# Patient Record
Sex: MALE | Race: WHITE | NOT HISPANIC OR LATINO | Employment: OTHER | URBAN - METROPOLITAN AREA
[De-identification: names, ages, dates, MRNs, and addresses within clinical notes are randomized per-mention and may not be internally consistent; named-entity substitution may affect disease eponyms.]

---

## 2017-01-11 RX ORDER — NIFEDIPINE 90 MG/1
90 TABLET, FILM COATED, EXTENDED RELEASE ORAL DAILY
Qty: 90 TABLET | Refills: 0 | Status: SHIPPED | OUTPATIENT
Start: 2017-01-11 | End: 2023-03-27

## 2018-08-01 ENCOUNTER — OFFICE VISIT (OUTPATIENT)
Dept: DERMATOLOGY | Facility: CLINIC | Age: 63
End: 2018-08-01
Payer: COMMERCIAL

## 2018-08-01 VITALS — BODY MASS INDEX: 26.76 KG/M2 | WEIGHT: 176 LBS

## 2018-08-01 DIAGNOSIS — L81.4 LENTIGINES: ICD-10-CM

## 2018-08-01 DIAGNOSIS — L30.9 DERMATITIS: ICD-10-CM

## 2018-08-01 DIAGNOSIS — D18.00 ANGIOMA: ICD-10-CM

## 2018-08-01 DIAGNOSIS — L82.1 SEBORRHEIC KERATOSES: Primary | ICD-10-CM

## 2018-08-01 PROCEDURE — 3008F BODY MASS INDEX DOCD: CPT | Mod: CPTII,S$GLB,, | Performed by: DERMATOLOGY

## 2018-08-01 PROCEDURE — 99202 OFFICE O/P NEW SF 15 MIN: CPT | Mod: S$GLB,,, | Performed by: DERMATOLOGY

## 2018-08-01 PROCEDURE — 99999 PR PBB SHADOW E&M-EST. PATIENT-LVL III: CPT | Mod: PBBFAC,,, | Performed by: DERMATOLOGY

## 2018-08-01 RX ORDER — FLUTICASONE PROPIONATE 50 MCG
SPRAY, SUSPENSION (ML) NASAL
COMMUNITY
Start: 2018-07-31 | End: 2023-03-27 | Stop reason: ALTCHOICE

## 2018-08-01 NOTE — PROGRESS NOTES
Subjective:       Patient ID:  Edvin Monzon is a 62 y.o. male who presents for   Chief Complaint   Patient presents with    Rash     left elbow     History of Present Illness: The patient presents with chief complaint of rash.  Location: elbows  Duration: months  Signs/Symptoms: itch    Prior treatments: otc creams           Review of Systems   Constitutional: Negative for fever.   Skin: Positive for itching and rash.   Hematologic/Lymphatic: Does not bruise/bleed easily.        Objective:    Physical Exam   Constitutional: He appears well-developed and well-nourished. No distress.   Neurological: He is alert and oriented to person, place, and time. He is not disoriented.   Psychiatric: He has a normal mood and affect.   Skin:   Areas Examined (abnormalities noted in diagram):   Scalp / Hair Palpated and Inspected  Head / Face Inspection Performed  Neck Inspection Performed  Chest / Axilla Inspection Performed  Abdomen Inspection Performed  Back Inspection Performed  RUE Inspected  LUE Inspection Performed                   Diagram Legend     Erythematous scaling macule/papule c/w actinic keratosis       Vascular papule c/w angioma      Pigmented verrucoid papule/plaque c/w seborrheic keratosis      Yellow umbilicated papule c/w sebaceous hyperplasia      Irregularly shaped tan macule c/w lentigo     1-2 mm smooth white papules consistent with Milia      Movable subcutaneous cyst with punctum c/w epidermal inclusion cyst      Subcutaneous movable cyst c/w pilar cyst      Firm pink to brown papule c/w dermatofibroma      Pedunculated fleshy papule(s) c/w skin tag(s)      Evenly pigmented macule c/w junctional nevus     Mildly variegated pigmented, slightly irregular-bordered macule c/w mildly atypical nevus      Flesh colored to evenly pigmented papule c/w intradermal nevus       Pink pearly papule/plaque c/w basal cell carcinoma      Erythematous hyperkeratotic cursted plaque c/w SCC      Surgical scar with no  "sign of skin cancer recurrence      Open and closed comedones      Inflammatory papules and pustules      Verrucoid papule consistent consistent with wart     Erythematous eczematous patches and plaques     Dystrophic onycholytic nail with subungual debris c/w onychomycosis     Umbilicated papule    Erythematous-base heme-crusted tan verrucoid plaque consistent with inflamed seborrheic keratosis     Erythematous Silvery Scaling Plaque c/w Psoriasis     See annotation      Assessment / Plan:        Seborrheic keratoses  Brochure provided  reassurance      Lentigines  The "ABCD" rules to observe pigmented lesions were reviewed.      Angiomas  reassurance      Dermatitis( irritant contact0 elbows  Topicort spray  cerave lotion  Dc leaning on areas             Follow-up in about 1 year (around 8/1/2019).  "

## 2018-12-05 ENCOUNTER — OFFICE VISIT (OUTPATIENT)
Dept: OTOLARYNGOLOGY | Facility: CLINIC | Age: 63
End: 2018-12-05
Payer: COMMERCIAL

## 2018-12-05 ENCOUNTER — CLINICAL SUPPORT (OUTPATIENT)
Dept: AUDIOLOGY | Facility: CLINIC | Age: 63
End: 2018-12-05
Payer: COMMERCIAL

## 2018-12-05 VITALS — SYSTOLIC BLOOD PRESSURE: 140 MMHG | HEART RATE: 82 BPM | TEMPERATURE: 98 F | DIASTOLIC BLOOD PRESSURE: 91 MMHG

## 2018-12-05 DIAGNOSIS — J30.9 CHRONIC ALLERGIC RHINITIS: Primary | ICD-10-CM

## 2018-12-05 DIAGNOSIS — H90.3 SENSORINEURAL HEARING LOSS, BILATERAL: Primary | ICD-10-CM

## 2018-12-05 DIAGNOSIS — H90.3 HEARING LOSS, SENSORINEURAL, HIGH FREQUENCY, BILATERAL: ICD-10-CM

## 2018-12-05 DIAGNOSIS — H93.13 TINNITUS, BILATERAL: ICD-10-CM

## 2018-12-05 DIAGNOSIS — H61.21 IMPACTED CERUMEN, RIGHT EAR: ICD-10-CM

## 2018-12-05 PROCEDURE — 69210 REMOVE IMPACTED EAR WAX UNI: CPT | Mod: S$GLB,,, | Performed by: OTOLARYNGOLOGY

## 2018-12-05 PROCEDURE — 99999 PR PBB SHADOW E&M-EST. PATIENT-LVL III: CPT | Mod: PBBFAC,,, | Performed by: OTOLARYNGOLOGY

## 2018-12-05 PROCEDURE — 92567 TYMPANOMETRY: CPT | Mod: S$GLB,,, | Performed by: PHYSICIAN ASSISTANT

## 2018-12-05 PROCEDURE — 99999 PR PBB SHADOW E&M-EST. PATIENT-LVL I: CPT | Mod: PBBFAC,,,

## 2018-12-05 PROCEDURE — 92557 COMPREHENSIVE HEARING TEST: CPT | Mod: S$GLB,,, | Performed by: PHYSICIAN ASSISTANT

## 2018-12-05 PROCEDURE — 99203 OFFICE O/P NEW LOW 30 MIN: CPT | Mod: 25,S$GLB,, | Performed by: OTOLARYNGOLOGY

## 2018-12-05 NOTE — PROGRESS NOTES
Audiological Evaluation:    Test results indicated a moderate to profound sloping SNHL AU, worse AD with good speech discrimination scores bilaterally.  Tympanometry revealed Type A tympanograms bilaterally.  Mr. Monzon complains of understanding speech in noise and understanding programs on the television.  Recommend:  1.  Otologic evaluation  2.  Annual hearing evaluations  3.  Noise protection  4.  HAC

## 2018-12-05 NOTE — PROGRESS NOTES
Subjective:       Patient ID: Edvin Monzon is a 63 y.o. male.    Chief Complaint: Cerumen Impaction    HPI: Mr. Monzon is a 63 year old CM who indicates his diagnosis with [place] nose  by Dr. Edvin Choi at [facility] several years ago.    He indicates postnasal drip and throat congestion symptoms primarily.  He indicates irritation the back of his throat with some green mucus expectoration.  He also indicates a pressure sensation around his right eye with occasional visual sx.    He was diagnosed with by Dr. Soares at [facility] with ocular rosacea.  His right eye is watering today.  Lacrimal obstruction was ruled out by another physician.  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  He also indicates inhalation of grain from [place].  He indicates dust and cat allergies; he lives with a cat.  He indicates a history of noise exposure shooting fireworks as a  youth in [facility].  I amrovided with a copy of his audiogram ordeed by Dr. Edvin Choi at [facility] and completed in November 2015, 3 years ago.  The tudy indicates the patient has normal hearing between 251,000 hertz with severely sloping bilateral high-frequency right ear greater than left sensorineural hearing loss in evidence.  He completed an audiometric study ordered by me here in November 2010, 8 years ago.  The studies are quite similar.  He had complained of right ear tinnitus during that time frame.    PMH:  Hearing loss, hayfever  PSH:  Tonsillectomy, arthroscopic right shoulder procedure 2015  Family history:  High cholesterol, hearing loss, mother with Alzheimer's  Allergies:  Cats, dust  Habits:  3 cups of coffee per day  Occupation:  Artist/  Review of Systems   Ears: Positive for hearing loss, ringing in ear and family history of hearing loss.    Nose:  Positive for postnasal drip.    Other:  Positive for arthritis. Negative for  rash.     Current Outpatient Medications on File Prior to Visit   Medication Sig Dispense Refill    fluticasone (FLONASE) 50 mcg/actuation nasal spray       naproxen (NAPROSYN) 500 MG tablet Take 500 mg by mouth 2 (two) times daily as needed.       cyclobenzaprine (FLEXERIL) 10 MG tablet       diazepam (VALIUM) 10 MG Tab       hydrocodone-acetaminophen 7.5-325mg (NORCO) 7.5-325 mg per tablet       ketoconazole (NIZORAL) 2 % cream AAA face bid 60 g 3    nifedipine (ADALAT CC) 90 MG TbSR Take 1 tablet (90 mg total) by mouth once daily. 90 tablet 0    tramadol (ULTRAM) 50 mg tablet        No current facility-administered medications on file prior to visit.            The patient completed an audiometric study performed by the [facility] audiology service.  Study is duplicated below and the results are reviewed with him in detail  Objective:         Blood pressure 140/91 pulse 82 temperature 97.6° height 5 ft 8 in weight 176 lb  General:  Alert and oriented gentleman in no acute distress; there is obvious tearing of his right eye  Both ears were examined under the microscope in the micro procedure room  Procedure:  A large occlusive cerumen impaction is extracted from the right ear canal with a blunt curette and suction.  Physical Exam   Constitutional: He is oriented to person, place, and time. He appears well-developed and well-nourished.   HENT:   Head: Normocephalic.       Right Ear: Hearing, tympanic membrane and ear canal normal. No drainage. No foreign bodies. No mastoid tenderness. Tympanic membrane is not perforated. No decreased hearing is noted.   Left Ear: Hearing, tympanic membrane and ear canal normal. No drainage. No foreign bodies. No mastoid tenderness. Tympanic membrane is not perforated. No decreased hearing is noted.   Ears:    Nose: No nose lacerations, nasal deformity, septal deviation or nasal septal hematoma. No epistaxis. Right sinus exhibits no maxillary sinus tenderness and no frontal sinus  tenderness. Left sinus exhibits no maxillary sinus tenderness and no frontal sinus tenderness.       Mouth/Throat: Uvula is midline, oropharynx is clear and moist and mucous membranes are normal. He does not have dentures. No oral lesions. No trismus in the jaw. No uvula swelling or dental caries. No oropharyngeal exudate or tonsillar abscesses.       Neck: No thyromegaly present.   Pulmonary/Chest: Effort normal. No stridor.   Lymphadenopathy:     He has no cervical adenopathy.   Neurological: He is alert and oriented to person, place, and time.   Skin: No rash noted.   Psychiatric: His behavior is normal.       Assessment:       1. Chronic allergic rhinitis    2. Tinnitus, bilateral    3. Hearing loss, sensorineural, high frequency, bilateral    4. Impacted cerumen, right ear        Plan:     Occlusive cerumen impaction extracted from AD eac  Audiometry reviewed, compared to [facility] 2015 study and our 2010 study  Yearly ear cleaning recommended  Hearing protection encouraged prn  Monitor hearing q 2-3 years  Use of A[place] nasal ist prn may help  Use of Bang Med rinse kit with distilled water may help cleanse sinuses

## 2019-10-16 NOTE — PATIENT INSTRUCTIONS
Occlusive cerumen impaction extracted from AD eac  Audiometry reviewed, compared to [facility] 2015 study and our 2010 study  Yearly ear cleaning recommended  Hearing protection encouraged prn  Monitor hearing q 2-3 years  Use of Ayr nasal ist prn may help  Use of Bang Med rinse kit with distilled water may help cleanse sinuses               [General Appearance - Alert] : alert [Extraocular Movements] : extraocular movements were intact [Outer Ear] : the ears and nose were normal in appearance [Nasal Cavity] : the nasal mucosa and septum were normal [Neck Appearance] : the appearance of the neck was normal [] : the neck was supple [Auscultation Breath Sounds / Voice Sounds] : lungs were clear to auscultation bilaterally [Heart Sounds] : normal S1 and S2 [Edema] : there was no peripheral edema [Abnormal Walk] : normal gait [Musculoskeletal - Swelling] : no joint swelling seen [Motor Tone] : muscle strength and tone were normal [Skin Color & Pigmentation] : normal skin color and pigmentation [No Focal Deficits] : no focal deficits [Oriented To Time, Place, And Person] : oriented to person, place, and time [FreeTextEntry1] : inappropriate affect - made jokes that were inappropriate while in the exam room.

## 2020-11-03 ENCOUNTER — OFFICE VISIT (OUTPATIENT)
Dept: DERMATOLOGY | Facility: CLINIC | Age: 65
End: 2020-11-03
Payer: MEDICARE

## 2020-11-03 VITALS — BODY MASS INDEX: 26.76 KG/M2 | WEIGHT: 176 LBS

## 2020-11-03 DIAGNOSIS — D18.01 CHERRY ANGIOMA: ICD-10-CM

## 2020-11-03 DIAGNOSIS — L82.1 SEBORRHEIC KERATOSES: ICD-10-CM

## 2020-11-03 DIAGNOSIS — L81.4 LENTIGINES: ICD-10-CM

## 2020-11-03 DIAGNOSIS — Z12.83 SKIN EXAM, SCREENING FOR CANCER: ICD-10-CM

## 2020-11-03 DIAGNOSIS — L57.0 MULTIPLE ACTINIC KERATOSES: Primary | ICD-10-CM

## 2020-11-03 PROCEDURE — 17000 DESTRUCT PREMALG LESION: CPT | Mod: S$PBB,,, | Performed by: DERMATOLOGY

## 2020-11-03 PROCEDURE — 17003 DESTRUCTION, PREMALIGNANT LESIONS; SECOND THROUGH 14 LESIONS: ICD-10-PCS | Mod: S$PBB,,, | Performed by: DERMATOLOGY

## 2020-11-03 PROCEDURE — 17003 DESTRUCT PREMALG LES 2-14: CPT | Mod: PBBFAC,PO | Performed by: DERMATOLOGY

## 2020-11-03 PROCEDURE — 99999 PR PBB SHADOW E&M-EST. PATIENT-LVL III: CPT | Mod: PBBFAC,,, | Performed by: DERMATOLOGY

## 2020-11-03 PROCEDURE — 99213 PR OFFICE/OUTPT VISIT, EST, LEVL III, 20-29 MIN: ICD-10-PCS | Mod: 25,S$PBB,, | Performed by: DERMATOLOGY

## 2020-11-03 PROCEDURE — 17000 DESTRUCT PREMALG LESION: CPT | Mod: PBBFAC,PO | Performed by: DERMATOLOGY

## 2020-11-03 PROCEDURE — 99213 OFFICE O/P EST LOW 20 MIN: CPT | Mod: PBBFAC,PO,25 | Performed by: DERMATOLOGY

## 2020-11-03 PROCEDURE — 17000 PR DESTRUCTION(LASER SURGERY,CRYOSURGERY,CHEMOSURGERY),PREMALIGNANT LESIONS,FIRST LESION: ICD-10-PCS | Mod: S$PBB,,, | Performed by: DERMATOLOGY

## 2020-11-03 PROCEDURE — 17003 DESTRUCT PREMALG LES 2-14: CPT | Mod: S$PBB,,, | Performed by: DERMATOLOGY

## 2020-11-03 PROCEDURE — 99213 OFFICE O/P EST LOW 20 MIN: CPT | Mod: 25,S$PBB,, | Performed by: DERMATOLOGY

## 2020-11-03 PROCEDURE — 99999 PR PBB SHADOW E&M-EST. PATIENT-LVL III: ICD-10-PCS | Mod: PBBFAC,,, | Performed by: DERMATOLOGY

## 2020-11-03 NOTE — PROGRESS NOTES
Subjective:       Patient ID:  Edvin Monzon is a 65 y.o. male who presents for   Chief Complaint   Patient presents with    Spot     face, itchy    Skin Check     UBSE     New lesions on left face not painful.  The rash on his elbows resolved.      Review of Systems   Constitutional: Negative for fever, chills, weight loss, weight gain, fatigue, night sweats and malaise.   Skin: Negative for daily sunscreen use, activity-related sunscreen use and wears hat.   Hematologic/Lymphatic: Does not bruise/bleed easily.        Objective:    Physical Exam   Constitutional: He appears well-developed and well-nourished. No distress.   Neurological: He is alert and oriented to person, place, and time. He is not disoriented.   Psychiatric: He has a normal mood and affect.   Skin:   Areas Examined (abnormalities noted in diagram):   Head / Face Inspection Performed  Neck Inspection Performed  Chest / Axilla Inspection Performed  Back Inspection Performed  RUE Inspected  LUE Inspection Performed                   Diagram Legend     Erythematous scaling macule/papule c/w actinic keratosis       Vascular papule c/w angioma      Pigmented verrucoid papule/plaque c/w seborrheic keratosis      Yellow umbilicated papule c/w sebaceous hyperplasia      Irregularly shaped tan macule c/w lentigo     1-2 mm smooth white papules consistent with Milia      Movable subcutaneous cyst with punctum c/w epidermal inclusion cyst      Subcutaneous movable cyst c/w pilar cyst      Firm pink to brown papule c/w dermatofibroma      Pedunculated fleshy papule(s) c/w skin tag(s)      Evenly pigmented macule c/w junctional nevus     Mildly variegated pigmented, slightly irregular-bordered macule c/w mildly atypical nevus      Flesh colored to evenly pigmented papule c/w intradermal nevus       Pink pearly papule/plaque c/w basal cell carcinoma      Erythematous hyperkeratotic cursted plaque c/w SCC      Surgical scar with no sign of skin cancer  "recurrence      Open and closed comedones      Inflammatory papules and pustules      Verrucoid papule consistent consistent with wart     Erythematous eczematous patches and plaques     Dystrophic onycholytic nail with subungual debris c/w onychomycosis     Umbilicated papule    Erythematous-base heme-crusted tan verrucoid plaque consistent with inflamed seborrheic keratosis     Erythematous Silvery Scaling Plaque c/w Psoriasis     See annotation      Assessment / Plan:        Multiple actinic keratoses   Cryosurgery Procedure Note    Verbal consent from the patient is obtained and the patient is aware of the precancerous quality and need for treatment of these lesions. Liquid nitrogen cryosurgery is applied to the 2 actinic keratoses, as detailed in the physical exam, to produce a freeze injury.      Seborrheic keratoses  reassurance    Cherry angiomas  reassurance      Lentigines  The "ABCD" rules to observe pigmented lesions were reviewed.      Skin exam, screening for cancer.    Upper body skin examination performed today including at least 6 points as noted in physical examination. No lesions suspicious for malignancy noted.               Follow up in about 6 months (around 5/3/2021).  "

## 2021-02-10 ENCOUNTER — IMMUNIZATION (OUTPATIENT)
Dept: PRIMARY CARE CLINIC | Facility: CLINIC | Age: 66
End: 2021-02-10
Payer: MEDICARE

## 2021-02-10 DIAGNOSIS — Z23 NEED FOR VACCINATION: Primary | ICD-10-CM

## 2021-02-10 PROCEDURE — 91300 PR SARS-COV- 2 COVID-19 VACCINE, NO PRSV, 30MCG/0.3ML, IM: CPT | Mod: PBBFAC | Performed by: INTERNAL MEDICINE

## 2021-02-10 PROCEDURE — 0001A PR IMMUNIZ ADMIN, SARS-COV-2 COVID-19 VACC, 30MCG/0.3ML, 1ST DOSE: CPT | Mod: PBBFAC | Performed by: INTERNAL MEDICINE

## 2021-02-10 RX ADMIN — RNA INGREDIENT BNT-162B2 0.3 ML: 0.23 INJECTION, SUSPENSION INTRAMUSCULAR at 12:02

## 2021-03-03 ENCOUNTER — IMMUNIZATION (OUTPATIENT)
Dept: PRIMARY CARE CLINIC | Facility: CLINIC | Age: 66
End: 2021-03-03
Payer: MEDICARE

## 2021-03-03 DIAGNOSIS — Z23 NEED FOR VACCINATION: Primary | ICD-10-CM

## 2021-03-03 PROCEDURE — 91300 PR SARS-COV- 2 COVID-19 VACCINE, NO PRSV, 30MCG/0.3ML, IM: CPT | Mod: S$GLB,,, | Performed by: INTERNAL MEDICINE

## 2021-03-03 PROCEDURE — 0002A PR IMMUNIZ ADMIN, SARS-COV-2 COVID-19 VACC, 30MCG/0.3ML, 2ND DOSE: CPT | Mod: CV19,S$GLB,, | Performed by: INTERNAL MEDICINE

## 2021-03-03 PROCEDURE — 0002A PR IMMUNIZ ADMIN, SARS-COV-2 COVID-19 VACC, 30MCG/0.3ML, 2ND DOSE: ICD-10-PCS | Mod: CV19,S$GLB,, | Performed by: INTERNAL MEDICINE

## 2021-03-03 PROCEDURE — 91300 PR SARS-COV- 2 COVID-19 VACCINE, NO PRSV, 30MCG/0.3ML, IM: ICD-10-PCS | Mod: S$GLB,,, | Performed by: INTERNAL MEDICINE

## 2021-03-03 RX ADMIN — Medication 0.3 ML: at 01:03

## 2021-10-01 ENCOUNTER — IMMUNIZATION (OUTPATIENT)
Dept: INTERNAL MEDICINE | Facility: CLINIC | Age: 66
End: 2021-10-01
Payer: MEDICARE

## 2021-10-01 DIAGNOSIS — Z23 NEED FOR VACCINATION: Primary | ICD-10-CM

## 2021-10-01 PROCEDURE — 0003A COVID-19, MRNA, LNP-S, PF, 30 MCG/0.3 ML DOSE VACCINE: CPT | Mod: CV19,PBBFAC | Performed by: INTERNAL MEDICINE

## 2021-10-01 PROCEDURE — 91300 COVID-19, MRNA, LNP-S, PF, 30 MCG/0.3 ML DOSE VACCINE: CPT | Mod: PBBFAC

## 2021-12-27 ENCOUNTER — NURSE TRIAGE (OUTPATIENT)
Dept: ADMINISTRATIVE | Facility: CLINIC | Age: 66
End: 2021-12-27
Payer: MEDICARE

## 2021-12-30 ENCOUNTER — NURSE TRIAGE (OUTPATIENT)
Dept: ADMINISTRATIVE | Facility: CLINIC | Age: 66
End: 2021-12-30
Payer: MEDICARE

## 2022-03-30 ENCOUNTER — PATIENT MESSAGE (OUTPATIENT)
Dept: DERMATOLOGY | Facility: CLINIC | Age: 67
End: 2022-03-30
Payer: MEDICARE

## 2022-04-22 ENCOUNTER — PATIENT MESSAGE (OUTPATIENT)
Dept: OPHTHALMOLOGY | Facility: CLINIC | Age: 67
End: 2022-04-22
Payer: MEDICARE

## 2022-04-22 ENCOUNTER — PATIENT MESSAGE (OUTPATIENT)
Dept: DERMATOLOGY | Facility: CLINIC | Age: 67
End: 2022-04-22
Payer: MEDICARE

## 2022-04-28 ENCOUNTER — NURSE TRIAGE (OUTPATIENT)
Dept: ADMINISTRATIVE | Facility: CLINIC | Age: 67
End: 2022-04-28
Payer: MEDICARE

## 2022-04-28 NOTE — TELEPHONE ENCOUNTER
Wants to know if he should switch up booster to  Moderna. Pfizer x 3 and then also got COVID.     https://www.cdc.gov/coronavirus/2019-ncov/vaccines/stay-up-to-date.html    Reason for Disposition   Information only question and nurse able to answer    Protocols used: INFORMATION ONLY CALL - NO TRIAGE-A-OH

## 2022-05-06 ENCOUNTER — IMMUNIZATION (OUTPATIENT)
Dept: PHARMACY | Facility: CLINIC | Age: 67
End: 2022-05-06
Payer: MEDICARE

## 2022-05-06 DIAGNOSIS — Z23 NEED FOR VACCINATION: Primary | ICD-10-CM

## 2022-05-17 ENCOUNTER — PATIENT MESSAGE (OUTPATIENT)
Dept: OPHTHALMOLOGY | Facility: CLINIC | Age: 67
End: 2022-05-17

## 2022-05-17 ENCOUNTER — OFFICE VISIT (OUTPATIENT)
Dept: OPHTHALMOLOGY | Facility: CLINIC | Age: 67
End: 2022-05-17
Attending: OPHTHALMOLOGY
Payer: MEDICARE

## 2022-05-17 DIAGNOSIS — H25.12 NUCLEAR SCLEROSIS OF LEFT EYE: Primary | ICD-10-CM

## 2022-05-17 DIAGNOSIS — Z96.1 PSEUDOPHAKIA: ICD-10-CM

## 2022-05-17 PROCEDURE — 99999 PR PBB SHADOW E&M-EST. PATIENT-LVL III: CPT | Mod: PBBFAC,,, | Performed by: OPHTHALMOLOGY

## 2022-05-17 PROCEDURE — 99213 OFFICE O/P EST LOW 20 MIN: CPT | Mod: PBBFAC | Performed by: OPHTHALMOLOGY

## 2022-05-17 PROCEDURE — 92004 COMPRE OPH EXAM NEW PT 1/>: CPT | Mod: S$PBB,,, | Performed by: OPHTHALMOLOGY

## 2022-05-17 PROCEDURE — 99999 PR PBB SHADOW E&M-EST. PATIENT-LVL III: ICD-10-PCS | Mod: PBBFAC,,, | Performed by: OPHTHALMOLOGY

## 2022-05-17 PROCEDURE — 92004 PR EYE EXAM, NEW PATIENT,COMPREHESV: ICD-10-PCS | Mod: S$PBB,,, | Performed by: OPHTHALMOLOGY

## 2022-05-17 NOTE — PROGRESS NOTES
HPI     67 y/o male presents to clinic for second opinion from cataract sx    +CE/IOL OD 7/2021    Pt states had surgery OD 7/2021 with Dr Krishna. Thinks lens that was   implanted was wrong.   Went to get cataracts checked about a year ago. Was told to see would be   able to see 3-4 ft in front of him and distance. Would need glasses for   near. Kept having flashes and blur spot in the middle of [place]. Went to a   retinal specialist and dx'd with PCO, PVD, and CME OD. Didn't know what   was going on. Then told diopter might of been off.   Cannot use progressive glasses. Pt is a  and needs things   perfect      Discussed with pt type of cataract sx and laser options. Pt became very   combative and argumentative. Left room because pt was not willing to   listen to anything and said I didn't know what I was talking about.     Last edited by Henna Mckeon on 5/17/2022 10:37 AM. (History)            Assessment /Plan     For exam results, see Encounter Report.    Nuclear sclerosis of left eye    Pseudophakia      Discussed expectations from phaco OD with monofocal  Sl hyperopia +0.50

## 2022-06-07 ENCOUNTER — OFFICE VISIT (OUTPATIENT)
Dept: DERMATOLOGY | Facility: CLINIC | Age: 67
End: 2022-06-07
Payer: MEDICARE

## 2022-06-07 VITALS — BODY MASS INDEX: 26.76 KG/M2 | WEIGHT: 176 LBS

## 2022-06-07 DIAGNOSIS — D18.01 CHERRY ANGIOMA: Primary | ICD-10-CM

## 2022-06-07 DIAGNOSIS — Z12.83 SKIN EXAM, SCREENING FOR CANCER: ICD-10-CM

## 2022-06-07 DIAGNOSIS — L82.1 SEBORRHEIC KERATOSES: ICD-10-CM

## 2022-06-07 DIAGNOSIS — D22.9 NEVUS OF MULTIPLE SITES: ICD-10-CM

## 2022-06-07 DIAGNOSIS — L81.4 LENTIGINES: ICD-10-CM

## 2022-06-07 PROCEDURE — 99999 PR PBB SHADOW E&M-EST. PATIENT-LVL III: CPT | Mod: PBBFAC,,, | Performed by: DERMATOLOGY

## 2022-06-07 PROCEDURE — 99213 OFFICE O/P EST LOW 20 MIN: CPT | Mod: PBBFAC,PO | Performed by: DERMATOLOGY

## 2022-06-07 PROCEDURE — 99213 OFFICE O/P EST LOW 20 MIN: CPT | Mod: S$PBB,,, | Performed by: DERMATOLOGY

## 2022-06-07 PROCEDURE — 99999 PR PBB SHADOW E&M-EST. PATIENT-LVL III: ICD-10-PCS | Mod: PBBFAC,,, | Performed by: DERMATOLOGY

## 2022-06-07 PROCEDURE — 99213 PR OFFICE/OUTPT VISIT, EST, LEVL III, 20-29 MIN: ICD-10-PCS | Mod: S$PBB,,, | Performed by: DERMATOLOGY

## 2022-06-07 NOTE — PROGRESS NOTES
Subjective:       Patient ID:  Edvin Monzon is a 66 y.o. male who presents for   Chief Complaint   Patient presents with    Skin Check     UBSE     Would like skin check no recurrence of the 2 ak s treated on his left cheek.       Review of Systems   Constitutional: Negative for fever, chills, weight loss, weight gain, fatigue, night sweats and malaise.   Skin: Positive for daily sunscreen use, activity-related sunscreen use and wears hat.   Hematologic/Lymphatic: Does not bruise/bleed easily.        Objective:    Physical Exam   Constitutional: He appears well-developed and well-nourished. No distress.   Neurological: He is alert and oriented to person, place, and time. He is not disoriented.   Psychiatric: He has a normal mood and affect.   Skin:   Areas Examined (abnormalities noted in diagram):   Head / Face Inspection Performed  Neck Inspection Performed  Chest / Axilla Inspection Performed  Abdomen Inspection Performed  Back Inspection Performed  RUE Inspected  LUE Inspection Performed                   Diagram Legend     Erythematous scaling macule/papule c/w actinic keratosis       Vascular papule c/w angioma      Pigmented verrucoid papule/plaque c/w seborrheic keratosis      Yellow umbilicated papule c/w sebaceous hyperplasia      Irregularly shaped tan macule c/w lentigo     1-2 mm smooth white papules consistent with Milia      Movable subcutaneous cyst with punctum c/w epidermal inclusion cyst      Subcutaneous movable cyst c/w pilar cyst      Firm pink to brown papule c/w dermatofibroma      Pedunculated fleshy papule(s) c/w skin tag(s)      Evenly pigmented macule c/w junctional nevus     Mildly variegated pigmented, slightly irregular-bordered macule c/w mildly atypical nevus      Flesh colored to evenly pigmented papule c/w intradermal nevus       Pink pearly papule/plaque c/w basal cell carcinoma      Erythematous hyperkeratotic cursted plaque c/w SCC      Surgical scar with no sign of skin  "cancer recurrence      Open and closed comedones      Inflammatory papules and pustules      Verrucoid papule consistent consistent with wart     Erythematous eczematous patches and plaques     Dystrophic onycholytic nail with subungual debris c/w onychomycosis     Umbilicated papule    Erythematous-base heme-crusted tan verrucoid plaque consistent with inflamed seborrheic keratosis     Erythematous Silvery Scaling Plaque c/w Psoriasis     See annotation      Assessment / Plan:        Cherry angioma  reassurance      Lentigines  The "ABCD" rules to observe pigmented lesions were reviewed.  sunscreen    Seborrheic keratoses  reassurance      Nevus of multiple sites  The "ABCD" rules to observe pigmented lesions were reviewed.  Brochure provided      Skin exam, screening for cancer   No lesions suspicious for malignancy noted.    Recommend daily sun protection/avoidance and use of at least SPF 30, broad spectrum sunscreen (OTC drug).                Follow up in about 1 year (around 6/7/2023).  "

## 2022-08-04 ENCOUNTER — PATIENT MESSAGE (OUTPATIENT)
Dept: OTOLARYNGOLOGY | Facility: CLINIC | Age: 67
End: 2022-08-04
Payer: MEDICARE

## 2022-08-09 ENCOUNTER — OFFICE VISIT (OUTPATIENT)
Dept: OTOLARYNGOLOGY | Facility: CLINIC | Age: 67
End: 2022-08-09
Payer: MEDICARE

## 2022-08-09 DIAGNOSIS — Z86.16 HISTORY OF COVID-19: ICD-10-CM

## 2022-08-09 DIAGNOSIS — R43.9 DYSOSMIA: Primary | ICD-10-CM

## 2022-08-09 DIAGNOSIS — J30.89 NON-SEASONAL ALLERGIC RHINITIS, UNSPECIFIED TRIGGER: ICD-10-CM

## 2022-08-09 PROCEDURE — 99202 OFFICE O/P NEW SF 15 MIN: CPT | Mod: 95,,, | Performed by: OTOLARYNGOLOGY

## 2022-08-09 PROCEDURE — 99202 PR OFFICE/OUTPT VISIT, NEW, LEVL II, 15-29 MIN: ICD-10-PCS | Mod: 95,,, | Performed by: OTOLARYNGOLOGY

## 2022-08-09 RX ORDER — AZELASTINE 1 MG/ML
1 SPRAY, METERED NASAL 2 TIMES DAILY
Qty: 30 ML | Refills: 11 | Status: SHIPPED | OUTPATIENT
Start: 2022-08-09 | End: 2026-03-15

## 2022-08-09 NOTE — PROGRESS NOTES
Subjective:     Chief Complaint:  Dysosmia    Edvin Monzon is a 66 y.o. male who was self-referred for dysosmia.      Patient reports Covid diagnosis Dec 2021. Since then, he reports significant decrease in smell. Reports since then, his sense of smell has fluctuated.  Reports daily alterations and has ability to smell.    Denies frequent sinus infections. Reports seasonal and perennial allergies, usually worse in the spring.  He is allergic to dust, cats, and [place].    There is not a prior history of sinonasal surgery.  He is not an active smoker.        SNOT Questionnaire Total Score 8/4/2022   SNOT-22 score: 33      NOSE score:: (P) 55%      Past Medical History  He has a past medical history of Allergy, Arthritis, Ehrlichiosis, unspecified, History of colonoscopy, and Joint pain.    Past Surgical History  He has a past surgical history that includes Tonsillectomy and Shoulder surgery.    Family History  His family history includes Alzheimer's disease in his mother; No Known Problems in his father.    Social History  He reports that he has never smoked. He has never used smokeless tobacco. He reports current alcohol use. He reports that he does not use drugs.    Allergies  He is allergic to corticosteroids (glucocorticoids).    Medications  He has a current medication list which includes the following prescription(s): azelastine, cyclobenzaprine, diazepam, fluticasone propionate, hydrocodone-acetaminophen, ketoconazole, naproxen, nifedipine, sars-cov-2 (covid-19), and tramadol.    Review of Systems     Constitutional: Positive for fatigue.      HENT: Positive for hearing loss, postnasal drip and sinus pressure.      Eyes:  Positive for eye itching.     Respiratory:  Negative for cough, shortness of breath, sleep apnea, snoring and wheezing.      Cardiovascular:  Negative for chest pain, foot swelling, irregular heartbeat and swollen veins.     Gastrointestinal:  Negative for abdominal pain, acid reflux,  constipation, diarrhea, heartburn and vomiting.     Genitourinary: Negative for difficulty urinating, sexual problems and frequent urination.     Musc: Negative for aching joints, aching muscles, back pain and neck pain.     Skin: Negative for rash.     Allergy: Positive for seasonal allergies.     Endocrine: Negative for cold intolerance and heat intolerance.      Neurological: Negative for dizziness, headaches, light-headedness, seizures and tremors.      Hematologic: Negative for bruises/bleeds easily and swollen glands.      Psychiatric: Negative for decreased concentration, depression, nervous/anxious and sleep disturbance.               Objective:     Virtual visit    Constitutional:   He appears well-developed and well-nourished. Normal speech.      Head:  Normocephalic and atraumatic.     Psychiatric:   He has a normal mood and affect. His speech is normal and behavior is normal.       Procedure    None    Data Reviewed    WBC (K/uL)   Date Value   01/27/2015 7.12     Eosinophil % (%)   Date Value   01/27/2015 2.5     Eos # (K/uL)   Date Value   01/27/2015 0.2     Platelets (K/uL)   Date Value   01/27/2015 229     Glucose (mg/dL)   Date Value   01/27/2015 91     No results found for: IGE       Assessment:     1. Dysosmia    2. History of COVID-19    3. Non-seasonal allergic rhinitis, unspecified trigger          Plan:     I had a long discussion with the patient regarding his condition and the further workup and management options.  Discussed trial of Astelin nasal spray for his allergic rhinitis.  Patient developed anosmia/dysomsia following covid.  Discussed treating his dysosmia with olfactory retraining for 2-3 months.  Will send him retraining handout.  He will follow-up with me for an in-person evaluation should symptoms change or fail to improve. All questions answered and patient encouraged to call with any questions or concerns.

## 2022-12-07 ENCOUNTER — IMMUNIZATION (OUTPATIENT)
Dept: INTERNAL MEDICINE | Facility: CLINIC | Age: 67
End: 2022-12-07
Payer: MEDICARE

## 2022-12-07 ENCOUNTER — TELEPHONE (OUTPATIENT)
Dept: OTOLARYNGOLOGY | Facility: CLINIC | Age: 67
End: 2022-12-07
Payer: MEDICARE

## 2022-12-07 ENCOUNTER — PATIENT MESSAGE (OUTPATIENT)
Dept: OTOLARYNGOLOGY | Facility: CLINIC | Age: 67
End: 2022-12-07
Payer: MEDICARE

## 2022-12-07 PROCEDURE — G0008 ADMIN INFLUENZA VIRUS VAC: HCPCS | Mod: PBBFAC

## 2022-12-15 ENCOUNTER — OFFICE VISIT (OUTPATIENT)
Dept: OTOLARYNGOLOGY | Facility: CLINIC | Age: 67
End: 2022-12-15
Payer: MEDICARE

## 2022-12-15 VITALS — DIASTOLIC BLOOD PRESSURE: 82 MMHG | HEART RATE: 80 BPM | SYSTOLIC BLOOD PRESSURE: 145 MMHG

## 2022-12-15 DIAGNOSIS — J34.89 NASAL OBSTRUCTION: ICD-10-CM

## 2022-12-15 DIAGNOSIS — Z86.16 HISTORY OF COVID-19: ICD-10-CM

## 2022-12-15 DIAGNOSIS — H61.23 IMPACTED CERUMEN, BILATERAL: ICD-10-CM

## 2022-12-15 DIAGNOSIS — Z91.09 HISTORY OF ENVIRONMENTAL ALLERGIES: ICD-10-CM

## 2022-12-15 DIAGNOSIS — R09.82 POST-NASAL DRIP: Primary | ICD-10-CM

## 2022-12-15 DIAGNOSIS — R43.9 DYSOSMIA: ICD-10-CM

## 2022-12-15 DIAGNOSIS — R49.9 CHANGE IN VOICE: ICD-10-CM

## 2022-12-15 DIAGNOSIS — J34.2 NASAL SEPTAL DEVIATION: ICD-10-CM

## 2022-12-15 DIAGNOSIS — H90.3 PERCEPTIVE HEARING LOSS, BILATERAL: ICD-10-CM

## 2022-12-15 PROCEDURE — 99999 PR PBB SHADOW E&M-EST. PATIENT-LVL III: CPT | Mod: PBBFAC,,, | Performed by: OTOLARYNGOLOGY

## 2022-12-15 PROCEDURE — 69210 REMOVE IMPACTED EAR WAX UNI: CPT | Mod: 50,PBBFAC | Performed by: OTOLARYNGOLOGY

## 2022-12-15 PROCEDURE — 99213 OFFICE O/P EST LOW 20 MIN: CPT | Mod: 25,S$PBB,, | Performed by: OTOLARYNGOLOGY

## 2022-12-15 PROCEDURE — 69210 REMOVE IMPACTED EAR WAX UNI: CPT | Mod: S$PBB,,, | Performed by: OTOLARYNGOLOGY

## 2022-12-15 PROCEDURE — 99213 OFFICE O/P EST LOW 20 MIN: CPT | Mod: 25,PBBFAC | Performed by: OTOLARYNGOLOGY

## 2022-12-15 PROCEDURE — 99213 PR OFFICE/OUTPT VISIT, EST, LEVL III, 20-29 MIN: ICD-10-PCS | Mod: 25,S$PBB,, | Performed by: OTOLARYNGOLOGY

## 2022-12-15 PROCEDURE — 69210 PR REMOVAL IMPACTED CERUMEN REQUIRING INSTRUMENTATION, UNILATERAL: ICD-10-PCS | Mod: S$PBB,,, | Performed by: OTOLARYNGOLOGY

## 2022-12-15 PROCEDURE — 99999 PR PBB SHADOW E&M-EST. PATIENT-LVL III: ICD-10-PCS | Mod: PBBFAC,,, | Performed by: OTOLARYNGOLOGY

## 2022-12-15 RX ORDER — ERYTHROMYCIN 5 MG/G
OINTMENT OPHTHALMIC
COMMUNITY
Start: 2022-11-02 | End: 2023-03-27 | Stop reason: ALTCHOICE

## 2022-12-15 NOTE — PATIENT INSTRUCTIONS
Vocal hygiene instruction sheet provided  Trial of BOTH Astelin  nasal spray and Flonase NSS; one spray of each to each nostril BID x 6 weeks  UPSIT to be completed/returned  Cerumen removed from both eacs  Audiometry encouraged on return; candidate for amplification?  Office  endoscopy on return pending course  Call for any significant change in status  Consider sinus CT pending course

## 2022-12-15 NOTE — PROGRESS NOTES
CC: Dysosmia +   HPI:Mr. Monzon is a 67-year-old old patient of mine who presents today for re-evaluation of poor ability to smell.    He contracted COVID December 2021 ( with his wife) and his olfactory function has not been the same/normal since. His smell acumen has fluctuated over time.   He also indicates some right sided post nasal drip sx ( a constant aggravation) and right sided sinus problems.   His ears have devon bothering him and the back of his neck has been painful and he has been queasy lately.  His throat has been bothering him as well.   Later on today after the visit after he got home, he wondered  if he needs an antibiotic as he is heading out of town for the holidays. His wife also expressed concern about his chronic throat sx especially in light of his fear of throat or esophageal CA ( [facility]) .  He had been evaluated by our ENT colleague Dr. Emiliano White for dysosmia symptoms in early August this year.  He reported daily alterations in his ability to smell.  He was diagnosed with  dysosmia in light of his history of COVID-19 as well as non seasonal allergic rhinitis.  He was prescribed Astelin nasal spray. Olfactory re-training was encouraged for 2-3 months ( unclear whether this was carried out).  He denied a history of frequent sinus infections.  He did reports seasonal and perennial allergies usually worse in the spring.  He is allergic to dust cats and [place].   He has spent time in the [facility] about 6 weeks ago chopping wood and possibly being exposed to mold in a [place] the woods.  He indicates a slight change in his voicing in the recent past; he cannot hit falsetto notes as easily as he used to.  His voice may be slowly improving.  He refused our offer of an audiogram today.  He has received COVID vaccines and a recent flu vaccination.      Past Medical History:   Diagnosis Date    Allergy     Arthritis     Ehrlichiosis, unspecified     History  of colonoscopy     normal according to the patient in 2012.  He was told to return in 2022    Joint pain      Past Surgical History:   Procedure Laterality Date    SHOULDER SURGERY      TONSILLECTOMY       Current Outpatient Medications on File Prior to Visit   Medication Sig Dispense Refill    azelastine (ASTELIN) 137 mcg (0.1 %) nasal spray 1 spray (137 mcg total) by Nasal route 2 (two) times daily. 30 mL 11    sars-cov-2, covid-19, (MODERNA COVID-19) 50 mcg/0.25 ml injection (BOOSTER) Inject into the muscle. 0.25 mL 0    cyclobenzaprine (FLEXERIL) 10 MG tablet       diazepam (VALIUM) 10 MG Tab       erythromycin (ROMYCIN) ophthalmic ointment Place into both eyes.      fluticasone (FLONASE) 50 mcg/actuation nasal spray       hydrocodone-acetaminophen 7.5-325mg (NORCO) 7.5-325 mg per tablet       ketoconazole (NIZORAL) 2 % cream AAA face bid (Patient not taking: Reported on 11/3/2020) 60 g 3    naproxen (NAPROSYN) 500 MG tablet Take 500 mg by mouth 2 (two) times daily as needed.       nifedipine (ADALAT CC) 90 MG TbSR Take 1 tablet (90 mg total) by mouth once daily. 90 tablet 0    tramadol (ULTRAM) 50 mg tablet        No current facility-administered medications on file prior to visit.     ALL; corticosteroids  Answers submitted by the patient for this visit:  Review of Symptoms Questionnaire  (Submitted on 12/14/2022)  Fatigue (Tiredness)?: Yes  hearing loss: Yes  Sinus infection(s)?: Yes  postnasal drip: Yes  Tooth/Dental Problems?: Yes  Voice Change?: Yes  eye itching: Yes  None of these: Yes  None of these : Yes  None of these: Yes  None of these: Yes  None of these: Yes  None of these : Yes  Seasonal Allergies?: Yes  None of these : Yes  None of these: Yes  None of these: Yes  sleep disturbance: Yes    PE: Gen.:  Alert and oriented  male in no acute distress; he is not hoarse and he is not dyspneic and he does not appear ill. He is wearing a mask.  Blood pressure 142/82 pulse 80 ht 5 ft 8 in weight  176 lb    Both ears were examined under the microscope.  Procedures:  Cerumen impactions are carefully extracted from each ear canal manually.    Both TMs are clear and intact as visualized.  Both middle ear spaces appear well aerated  Nasal exam reveals evidence of a right-sided nasal septal deviation/deflection with right-sided nasal obstruction in evidence.  Left nasal passage is more patent.  There is no evidence of purulent discharge or polypoid disease in either passage after Elvis-Synephrine decongestion of each.  Oropharyngeal exam reveals evidence of oropharyngeal lateral gutter inflammation.  There is no evidence of uvula swelling.  There is no evidence of tonsillar exudate.  The floor of the mouth is supple.  There is no evidence of oral ulceration.  Neck examination is within normal limits; there is no evidence of significant anterior nor posterior cervical adenopathy.    DIAGNOSIS:     ICD-10-CM ICD-9-CM    1. Post-nasal drip  R09.82 784.91       2. Nasal obstruction ; right  J34.89 478.19       3. Nasal septal deviation ; right  J34.2 470       4. Change in voice  R49.9 784.49       5. History of environmental allergies  Z91.09 V15.09       6. Perceptive hearing loss, bilateral  H90.3 389.18       7. Dysosmia since acute COVID 12/2021 R43.9 781.1       8. History of COVID-19 12/2021 Z86.16 V12.09       9. Impacted cerumen, bilateral  H61.23 380.4         PLAN: Vocal hygiene instruction sheet provided  Trial of BOTH Astelin  nasal spray and Flonase NSS; one spray of each to each nostril BID x 6 weeks  UPSIT to be completed/returned  Cerumen removed from both eacs  Audiometry encouraged on return; candidate for amplification?  Office  endoscopy on return pending course  Call for any significant change in status  Consider sinus CT pending course  Consider office endoscopy pending course

## 2022-12-17 ENCOUNTER — PATIENT MESSAGE (OUTPATIENT)
Dept: OTOLARYNGOLOGY | Facility: CLINIC | Age: 67
End: 2022-12-17
Payer: MEDICARE

## 2023-01-05 ENCOUNTER — PATIENT MESSAGE (OUTPATIENT)
Dept: OTOLARYNGOLOGY | Facility: CLINIC | Age: 68
End: 2023-01-05
Payer: MEDICARE

## 2023-01-06 ENCOUNTER — PATIENT MESSAGE (OUTPATIENT)
Dept: OTOLARYNGOLOGY | Facility: CLINIC | Age: 68
End: 2023-01-06
Payer: MEDICARE

## 2023-02-26 ENCOUNTER — PATIENT MESSAGE (OUTPATIENT)
Dept: OTOLARYNGOLOGY | Facility: CLINIC | Age: 68
End: 2023-02-26
Payer: MEDICARE

## 2023-02-27 DIAGNOSIS — J06.9 URI, ACUTE: ICD-10-CM

## 2023-02-27 DIAGNOSIS — J02.9 SORETHROAT: Primary | ICD-10-CM

## 2023-02-27 RX ORDER — AZITHROMYCIN 250 MG/1
TABLET, FILM COATED ORAL
Qty: 6 TABLET | Refills: 1 | Status: SHIPPED | OUTPATIENT
Start: 2023-02-27 | End: 2023-03-27 | Stop reason: ALTCHOICE

## 2023-03-06 DIAGNOSIS — J06.9 URI, ACUTE: Primary | ICD-10-CM

## 2023-03-06 RX ORDER — AMOXICILLIN AND CLAVULANATE POTASSIUM 875; 125 MG/1; MG/1
1 TABLET, FILM COATED ORAL EVERY 12 HOURS
Qty: 14 TABLET | Refills: 0 | Status: SHIPPED | OUTPATIENT
Start: 2023-03-06 | End: 2023-03-27 | Stop reason: ALTCHOICE

## 2023-03-13 ENCOUNTER — PATIENT MESSAGE (OUTPATIENT)
Dept: ADMINISTRATIVE | Facility: HOSPITAL | Age: 68
End: 2023-03-13
Payer: MEDICARE

## 2023-03-13 ENCOUNTER — PATIENT OUTREACH (OUTPATIENT)
Dept: ADMINISTRATIVE | Facility: HOSPITAL | Age: 68
End: 2023-03-13
Payer: MEDICARE

## 2023-03-27 ENCOUNTER — PATIENT MESSAGE (OUTPATIENT)
Dept: INTERNAL MEDICINE | Facility: CLINIC | Age: 68
End: 2023-03-27

## 2023-03-27 ENCOUNTER — HOSPITAL ENCOUNTER (OUTPATIENT)
Dept: CARDIOLOGY | Facility: OTHER | Age: 68
Discharge: HOME | End: 2023-03-27
Attending: STUDENT IN AN ORGANIZED HEALTH CARE EDUCATION/TRAINING PROGRAM
Payer: MEDICARE

## 2023-03-27 ENCOUNTER — OFFICE VISIT (OUTPATIENT)
Dept: INTERNAL MEDICINE | Facility: CLINIC | Age: 68
End: 2023-03-27
Payer: MEDICARE

## 2023-03-27 VITALS
OXYGEN SATURATION: 99 % | WEIGHT: 180.75 LBS | DIASTOLIC BLOOD PRESSURE: 74 MMHG | BODY MASS INDEX: 27.49 KG/M2 | HEART RATE: 78 BPM | SYSTOLIC BLOOD PRESSURE: 128 MMHG

## 2023-03-27 DIAGNOSIS — Z23 NEED FOR VACCINATION: ICD-10-CM

## 2023-03-27 DIAGNOSIS — E78.2 MIXED HYPERLIPIDEMIA: ICD-10-CM

## 2023-03-27 DIAGNOSIS — H91.93 BILATERAL HEARING LOSS, UNSPECIFIED HEARING LOSS TYPE: ICD-10-CM

## 2023-03-27 DIAGNOSIS — Z12.5 SCREENING FOR MALIGNANT NEOPLASM OF PROSTATE: ICD-10-CM

## 2023-03-27 DIAGNOSIS — Z91.09 ENVIRONMENTAL ALLERGIES: ICD-10-CM

## 2023-03-27 DIAGNOSIS — R73.09 ABNORMAL GLUCOSE: ICD-10-CM

## 2023-03-27 DIAGNOSIS — Z00.00 HEALTH MAINTENANCE EXAMINATION: Primary | ICD-10-CM

## 2023-03-27 DIAGNOSIS — Z12.11 ENCOUNTER FOR COLORECTAL CANCER SCREENING: ICD-10-CM

## 2023-03-27 DIAGNOSIS — Z11.4 SCREENING FOR HIV WITHOUT PRESENCE OF RISK FACTORS: ICD-10-CM

## 2023-03-27 DIAGNOSIS — R06.00 DYSPNEA, UNSPECIFIED TYPE: ICD-10-CM

## 2023-03-27 DIAGNOSIS — Z12.12 ENCOUNTER FOR COLORECTAL CANCER SCREENING: ICD-10-CM

## 2023-03-27 PROBLEM — L71.8 OCULAR ROSACEA: Status: ACTIVE | Noted: 2023-03-27

## 2023-03-27 PROCEDURE — 93005 ELECTROCARDIOGRAM TRACING: CPT

## 2023-03-27 PROCEDURE — 99204 PR OFFICE/OUTPT VISIT, NEW, LEVL IV, 45-59 MIN: ICD-10-PCS | Mod: S$PBB,,, | Performed by: STUDENT IN AN ORGANIZED HEALTH CARE EDUCATION/TRAINING PROGRAM

## 2023-03-27 PROCEDURE — 99204 OFFICE O/P NEW MOD 45 MIN: CPT | Mod: S$PBB,,, | Performed by: STUDENT IN AN ORGANIZED HEALTH CARE EDUCATION/TRAINING PROGRAM

## 2023-03-27 PROCEDURE — 99999 PR PBB SHADOW E&M-EST. PATIENT-LVL IV: CPT | Mod: PBBFAC,,, | Performed by: STUDENT IN AN ORGANIZED HEALTH CARE EDUCATION/TRAINING PROGRAM

## 2023-03-27 PROCEDURE — 99214 OFFICE O/P EST MOD 30 MIN: CPT | Mod: PBBFAC | Performed by: STUDENT IN AN ORGANIZED HEALTH CARE EDUCATION/TRAINING PROGRAM

## 2023-03-27 PROCEDURE — 93010 EKG 12-LEAD: ICD-10-PCS | Mod: ,,, | Performed by: INTERNAL MEDICINE

## 2023-03-27 PROCEDURE — 99999 PR PBB SHADOW E&M-EST. PATIENT-LVL IV: ICD-10-PCS | Mod: PBBFAC,,, | Performed by: STUDENT IN AN ORGANIZED HEALTH CARE EDUCATION/TRAINING PROGRAM

## 2023-03-27 PROCEDURE — 93010 ELECTROCARDIOGRAM REPORT: CPT | Mod: ,,, | Performed by: INTERNAL MEDICINE

## 2023-03-27 RX ORDER — IBUPROFEN 100 MG/5ML
1000 SUSPENSION, ORAL (FINAL DOSE FORM) ORAL DAILY
COMMUNITY

## 2023-03-27 RX ORDER — MULTIVIT WITH MINERALS/HERBS
1 TABLET ORAL DAILY
COMMUNITY

## 2023-03-27 NOTE — PROGRESS NOTES
Subjective:       Patient ID: Edvin Monzon is a 67 y.o. male.    Chief Complaint: Health maintenance examination [Z00.00]    Patient is new to me, here to establish care.    Health maintenance -   Cologuard negative pre-COVID. Due for repeat   Denies family history of colorectal cancer.  Denies significant family history of cardiac disease.  Denies family history of prostate cancer.  Lab Results       Component                Value               Date                       PSA                      0.30                11/21/2013            UTD on influenza, Tdap, COVID primary/bivalent vaccinations.  Due for PCV20, shingles vaccinations.  Never smoker.  Drinks alcohol 3-5 times weekly, 1-2 drinks per sitting.  Rare marijuana use.  Due for HIV screening.  Completed hepatitis C screening.  Due for lipid screening.  Lab Results       Component                Value               Date                       LDLCALC                  158.0               01/27/2015            Due for diabetes screening.  Endorses overall healthy diet.   Eating plenty of fresh vegetables, fruits.  Eating mostly lean proteins.   Eating whole grains, limits processed foods.  Eats mostly at home.  Not currently exercising routinely.  Plans to start exercise regimen.   Endorses active lifestyle.    Endorses after the flu vaccine 07DEC started with respiratory illness  Resolved, but now with dyspnea intermittently  Can occur at rest, not necessarily with exertion   Was able to chop trees, work on tractor without SOB  Denies CP, palpitations, syncope, dizziness  Has appt with Dr. Dawson for cardiology next month  Dealing with more sinus problems since having COVID in 2021  Will be seeing Dr. White for ENT    Endorses progressive bilateral hearing loss  Endorses high frequencies most effected   Endorses bilateral tinnitus       Review of Systems   Constitutional:  Negative for appetite change, chills, fatigue, fever and unexpected weight change.    Respiratory:  Positive for shortness of breath. Negative for cough.    Cardiovascular:  Negative for chest pain, palpitations and leg swelling.   Gastrointestinal:  Negative for abdominal pain, constipation, diarrhea, nausea and vomiting.   Genitourinary:  Negative for difficulty urinating and frequency.   Skin:  Negative for rash.   Neurological:  Negative for dizziness, syncope, weakness and headaches.       Current Outpatient Medications   Medication Instructions    ascorbic acid (vitamin C) (VITAMIN C) 1,000 mg, Oral, Daily    azelastine (ASTELIN) 137 mcg, Nasal, 2 times daily    b complex vitamins tablet 1 tablet, Oral, Daily     Objective:      Vitals:    03/27/23 0929   BP: 128/74   Pulse: 78   SpO2: 99%   Weight: 82 kg (180 lb 12.4 oz)   PainSc: 0-No pain     Body mass index is 27.49 kg/m².    Physical Exam  Vitals reviewed.   Constitutional:       General: He is not in acute distress.     Appearance: Normal appearance. He is not ill-appearing or diaphoretic.   HENT:      Head: Normocephalic and atraumatic.      Right Ear: Tympanic membrane, ear canal and external ear normal. There is no impacted cerumen.      Left Ear: Tympanic membrane, ear canal and external ear normal. There is no impacted cerumen.      Nose: Congestion and rhinorrhea present. Rhinorrhea is clear.      Right Turbinates: Swollen. Not pale.      Left Turbinates: Swollen. Not pale.      Mouth/Throat:      Mouth: Mucous membranes are moist.      Pharynx: Oropharynx is clear. Posterior oropharyngeal erythema present. No pharyngeal swelling, oropharyngeal exudate or uvula swelling.   Eyes:      General: No scleral icterus.        Right eye: No discharge.         Left eye: No discharge.      Conjunctiva/sclera: Conjunctivae normal.   Neck:      Thyroid: No thyromegaly or thyroid tenderness.      Trachea: Trachea normal.   Cardiovascular:      Rate and Rhythm: Normal rate and regular rhythm.      Heart sounds: Normal heart sounds. No murmur  heard.    No friction rub. No gallop.   Pulmonary:      Effort: Pulmonary effort is normal. No respiratory distress.      Breath sounds: Normal breath sounds. No stridor. No wheezing, rhonchi or rales.   Abdominal:      General: Bowel sounds are normal. There is no distension.      Palpations: Abdomen is soft.      Tenderness: There is no abdominal tenderness. There is no guarding or rebound.   Musculoskeletal:         General: No swelling or deformity.      Cervical back: Neck supple.   Lymphadenopathy:      Head:      Right side of head: No submandibular or posterior auricular adenopathy.      Left side of head: No submandibular or posterior auricular adenopathy.      Cervical: No cervical adenopathy.      Right cervical: No superficial, deep or posterior cervical adenopathy.     Left cervical: No superficial, deep or posterior cervical adenopathy.      Upper Body:      Right upper body: No supraclavicular adenopathy.      Left upper body: No supraclavicular adenopathy.   Skin:     General: Skin is warm and dry.   Neurological:      General: No focal deficit present.      Mental Status: He is alert. Mental status is at baseline.      Gait: Gait normal.   Psychiatric:         Mood and Affect: Mood normal.         Behavior: Behavior normal.       Assessment:       1. Health maintenance examination    2. Dyspnea, unspecified type    3. Environmental allergies    4. Mixed hyperlipidemia    5. Bilateral hearing loss, unspecified hearing loss type    6. Need for vaccination    7. Encounter for colorectal cancer screening    8. Abnormal glucose    9. Screening for malignant neoplasm of prostate    10. Screening for HIV without presence of risk factors        Plan:       Dyspnea, unspecified type  Environmental allergies  Recommend daily antihistamine and nasal corticosteroid.  Try nasal saline rinses using only sterile or distilled water daily.   Continue azelastine nasal spray for symptom relief PRN.   Follow up with  cardiologist as scheduled  EKG and PFT's   RTC in 6-8 weeks for follow up.  -     SCHEDULED EKG 12-LEAD (to [facility]); Future  -     Complete PFT w/ bronchodilator; Future    Mixed hyperlipidemia  -     Comprehensive Metabolic Panel; Future  -     TSH; Future  -     Lipid Panel; Future  -     CBC Auto Differential; Future    Bilateral hearing loss, unspecified hearing loss type   Plans to follow up with audiologist at [facility]    Health maintenance examination  Reviewed and discussed age appropriate screenings and immunizations.  -     PSA, Screening; Future  -     Comprehensive Metabolic Panel; Future  -     TSH; Future  -     Lipid Panel; Future  -     Hemoglobin A1C; Future  -     CBC Auto Differential; Future  -     HIV 1/2 Ag/Ab (4th Gen); Future    Need for vaccination  Provided written Rx for shingles, PCV20 vaccinations, advised to obtain at [facility] pharmacy on 2nd floor or preferred pharmacy.    Encounter for colorectal cancer screening  -     Cologuard Screening (Multitarget Stool DNA); Future  -     Cologuard Screening (Multitarget Stool DNA)    Abnormal glucose  -     Hemoglobin A1C; Future    Screening for malignant neoplasm of prostate  -     PSA, Screening; Future    Screening for HIV without presence of risk factors  -     HIV 1/2 Ag/Ab (4th Gen); Future        Chayo Davidson MD  3/27/2023

## 2023-03-27 NOTE — PATIENT INSTRUCTIONS
"Here are some general recommendations for dietary and lifestyle modifications to help improve your cholesterol.   - Focus fat intake on "healthy fats" (monounsaturated and polyunsaturated) such as avocados, oils (olive, peanut, sesame, safflower), nuts, and cold-water fish (herring, salmon, cod, lake trout, mackerel, sardines).   - Limit your saturated fats that come from things like meats (even lean like chicken and turkey), butter, and tropical oils (coconut, palm).   - Definitely avoid hydrogenated oils and trans fats.  - Decrease your red meat consumption such as beef and pork to no more than once weekly.   - Set a goal of at least 150 minutes per week of aerobic exercise such as jogging, biking, or swimming. Try to exercise at least 4-5 days weekly.   - Avoid simple sugars such as sweets and sodas. Chose carbohydrate sources that are whole grains such as quinoa, farro, brown rice, whole wheat pasta, etc.   - Avoid or limit alcohol consumption as much as possible.      "

## 2023-03-28 ENCOUNTER — PATIENT MESSAGE (OUTPATIENT)
Dept: INTERNAL MEDICINE | Facility: CLINIC | Age: 68
End: 2023-03-28
Payer: MEDICARE

## 2023-03-28 DIAGNOSIS — R94.31 ABNORMAL EKG: Primary | ICD-10-CM

## 2023-03-28 DIAGNOSIS — R06.00 DYSPNEA, UNSPECIFIED TYPE: ICD-10-CM

## 2023-04-06 ENCOUNTER — HOSPITAL ENCOUNTER (OUTPATIENT)
Dept: PULMONOLOGY | Facility: OTHER | Age: 68
Discharge: HOME | End: 2023-04-06
Attending: STUDENT IN AN ORGANIZED HEALTH CARE EDUCATION/TRAINING PROGRAM
Payer: MEDICARE

## 2023-04-06 VITALS — RESPIRATION RATE: 16 BRPM | HEART RATE: 68 BPM

## 2023-04-06 DIAGNOSIS — R06.00 DYSPNEA, UNSPECIFIED TYPE: ICD-10-CM

## 2023-04-06 PROCEDURE — 94060 EVALUATION OF WHEEZING: CPT

## 2023-04-06 PROCEDURE — 94729 PR C02/MEMBANE DIFFUSE CAPACITY: ICD-10-PCS | Mod: 26,,, | Performed by: INTERNAL MEDICINE

## 2023-04-06 PROCEDURE — 94729 DIFFUSING CAPACITY: CPT | Mod: 26,,, | Performed by: INTERNAL MEDICINE

## 2023-04-06 PROCEDURE — 94729 DIFFUSING CAPACITY: CPT

## 2023-04-06 PROCEDURE — 94727 GAS DIL/WSHOT DETER LNG VOL: CPT | Mod: 26,,, | Performed by: INTERNAL MEDICINE

## 2023-04-06 PROCEDURE — 94060 EVALUATION OF WHEEZING: CPT | Mod: 26,,, | Performed by: INTERNAL MEDICINE

## 2023-04-06 PROCEDURE — 94727 GAS DIL/WSHOT DETER LNG VOL: CPT

## 2023-04-06 PROCEDURE — 25000242 PHARM REV CODE 250 ALT 637 W/ HCPCS: Performed by: STUDENT IN AN ORGANIZED HEALTH CARE EDUCATION/TRAINING PROGRAM

## 2023-04-06 PROCEDURE — 94060 PR EVAL OF BRONCHOSPASM: ICD-10-PCS | Mod: 26,,, | Performed by: INTERNAL MEDICINE

## 2023-04-06 PROCEDURE — 94727 PR PULM FUNCTION TEST BY GAS: ICD-10-PCS | Mod: 26,,, | Performed by: INTERNAL MEDICINE

## 2023-04-06 RX ORDER — ALBUTEROL SULFATE 2.5 MG/.5ML
2.5 SOLUTION RESPIRATORY (INHALATION) ONCE
Status: COMPLETED | OUTPATIENT
Start: 2023-04-06 | End: 2023-04-06

## 2023-04-06 RX ADMIN — ALBUTEROL SULFATE 2.5 MG: 2.5 SOLUTION RESPIRATORY (INHALATION) at 02:04

## 2023-04-06 NOTE — TELEPHONE ENCOUNTER
Referral to cardiology placed, please assist with scheduling with Dr. Cuello, Dr. Cat, or Dr. Carty. Also can we send him the paperwork to do a records request from his prior cardiologist? Thanks!   STROKE ALERT INFORMATION:   Date of Call: 2/20/2021 Time of call/page [phone]  Admission Source: ED (02/20/21 0830)  via Ambulance       STROKE ASSESSMENTS:  -Last Known Well Date: 02/19/21 at Last Known Well Time: 2200   -Initial NIHSS Score: 2 see scale items below.    -Patient’s dysphagia screening score: Pass (02/20/21 0830)  -Speech therapy contacted No  -Premorbid Modified Keira: :No significant disability despite symptoms: able to carry out all usual duties and activities (02/20/21 0830)    SNO Scale    1. Gaze Deviation  No    2. Global or Expressive Aphasia  No    3. One-Side Neglect  No    ACUTE INTERVENTION DATE  Stroke Intervention(s):  None (02/20/21 0830)    IV Alteplase Contraindications:  last known well time is greater that 4.5 hours.    Please consult on-call acute stroke neurologist if further stroke work-up is warranted.     For any deterioration in status or urgent needs, page [phone] and request the stroke RN.     For any routine needs, please call [phone] 8am-4pm Mon-Fri    Stroke Nurse Navigator to follow for stroke metrics and education.      NIH Stroke Scale 2 (02/20/21 0830)    Assessment Interval  Initial   Level of Consciousness 0 Alert   LOC Questions 1 Answers one question correctly   LOC Commands 0 Performs both tasks correctly   Best Gaze 0 Normal   Visual 0 No visual loss   Facial Palsy 0 Normal   Motor Arm-Left 0 No drift   Motor Arm-Right 0 No drift   Motor Leg-Left 0 No drift   Motor Leg-Right 0 No drift   Limb Ataxia 0 Absent   Sensory 1(LUE) Mild-to-moderate sensory loss(LUE)   Best Language 0 No aphasia   Dysarthria 0 Normal articulation   Extinction and Inattention 0 No abnormality   NIHSS Score 2        EDUCATION  Bedside education provided.  Questions were encouraged and answered.

## 2023-04-08 ENCOUNTER — PATIENT MESSAGE (OUTPATIENT)
Dept: INTERNAL MEDICINE | Facility: CLINIC | Age: 68
End: 2023-04-08
Payer: MEDICARE

## 2023-04-11 ENCOUNTER — OFFICE VISIT (OUTPATIENT)
Dept: CARDIOLOGY | Facility: CLINIC | Age: 68
End: 2023-04-11
Payer: MEDICARE

## 2023-04-11 VITALS
BODY MASS INDEX: 27.22 KG/M2 | HEART RATE: 81 BPM | DIASTOLIC BLOOD PRESSURE: 72 MMHG | SYSTOLIC BLOOD PRESSURE: 118 MMHG | WEIGHT: 179 LBS

## 2023-04-11 DIAGNOSIS — R06.09 DYSPNEA ON EXERTION: ICD-10-CM

## 2023-04-11 DIAGNOSIS — R94.31 ABNORMAL EKG: ICD-10-CM

## 2023-04-11 PROCEDURE — 99205 PR OFFICE/OUTPT VISIT, NEW, LEVL V, 60-74 MIN: ICD-10-PCS | Mod: S$PBB,,, | Performed by: INTERNAL MEDICINE

## 2023-04-11 PROCEDURE — 99205 OFFICE O/P NEW HI 60 MIN: CPT | Mod: S$PBB,,, | Performed by: INTERNAL MEDICINE

## 2023-04-11 PROCEDURE — 99999 PR PBB SHADOW E&M-EST. PATIENT-LVL III: CPT | Mod: PBBFAC,,, | Performed by: INTERNAL MEDICINE

## 2023-04-11 PROCEDURE — 99999 PR PBB SHADOW E&M-EST. PATIENT-LVL III: ICD-10-PCS | Mod: PBBFAC,,, | Performed by: INTERNAL MEDICINE

## 2023-04-11 PROCEDURE — 99213 OFFICE O/P EST LOW 20 MIN: CPT | Mod: PBBFAC | Performed by: INTERNAL MEDICINE

## 2023-04-11 NOTE — PROGRESS NOTES
[facility]    Chief Complaint  Chief Complaint   Patient presents with    Abnormal ECG       HPI:    Presents for cardiac evaluation because of an abnormal electrocardiogram.  Electrocardiogram was performed after he mentions shortness of breath at a routine appointment.  His shortness of breath have been present for several months.  Nonexertional.  No associated symptoms.  He exercises intermittently.  When he does so, no problems with exertional chest discomfort.    Medications  Current Outpatient Medications   Medication Sig Dispense Refill    ascorbic acid, vitamin C, (VITAMIN C) 1000 MG tablet Take 1,000 mg by mouth once daily.      azelastine (ASTELIN) 137 mcg (0.1 %) nasal spray 1 spray (137 mcg total) by Nasal route 2 (two) times daily. 30 mL 11    b complex vitamins tablet Take 1 tablet by mouth once daily.       No current facility-administered medications for this visit.        History  Past Medical History:   Diagnosis Date    Allergy     Arthritis     Ehrlichiosis, unspecified     History of colonoscopy     normal according to the patient in 2012.  He was told to return in 2022    Joint pain      Past Surgical History:   Procedure Laterality Date    CATARACT EXTRACTION Right 07/07/2021    SHOULDER SURGERY Right 2015    TONSILLECTOMY       Social History     Socioeconomic History    Marital status:    Occupational History    Occupation:    Tobacco Use    Smoking status: Never     Passive exposure: Never    Smokeless tobacco: Never    Tobacco comments:     Hate it   Substance and Sexual Activity    Alcohol use: Yes     Alcohol/week: 8.0 standard drinks     Types: 3 Glasses of wine, 5 Cans of beer per week     Comment: A beer, a glass of wine now and then    Drug use: No    Sexual activity: Yes     Partners: Female     Birth control/protection: None     Family History   Problem Relation Age of Onset    Alzheimer's disease Mother     Hyperlipidemia Father     Arthritis  Father     Hearing loss Father     Melanoma Neg Hx         Allergies  Review of patient's allergies indicates:   Allergen Reactions    Corticosteroids (glucocorticoids)     Grass pollen-june grass standard     House dust        Review of Systems   Review of Systems   Constitutional: Negative for malaise/fatigue, weight gain and weight loss.   Eyes:  Negative for visual disturbance.   Cardiovascular:  Negative for chest pain, claudication, cyanosis, dyspnea on exertion, irregular heartbeat, leg swelling, near-syncope, orthopnea, palpitations, paroxysmal nocturnal dyspnea and syncope.   Respiratory:  Positive for shortness of breath. Negative for cough, hemoptysis, sleep disturbances due to breathing and wheezing.    Hematologic/Lymphatic: Negative for bleeding problem. Does not bruise/bleed easily.   Skin:  Negative for poor wound healing.   Musculoskeletal:  Negative for muscle cramps and myalgias.   Gastrointestinal:  Negative for abdominal pain, anorexia, diarrhea, heartburn, hematemesis, hematochezia, melena, nausea and vomiting.   Genitourinary:  Negative for hematuria and nocturia.   Neurological:  Negative for excessive daytime sleepiness, dizziness, focal weakness, light-headedness and weakness.     Physical Exam  Vitals:    04/11/23 1000   BP: 118/72   Pulse: 81     Wt Readings from Last 1 Encounters:   04/11/23 81.2 kg (179 lb)     Physical Exam  Vitals and nursing note reviewed.   Constitutional:       General: He is not in acute distress.     Appearance: He is not toxic-appearing or diaphoretic.   HENT:      Head: Normocephalic and atraumatic.      Mouth/Throat:      Lips: Pink.      Mouth: Mucous membranes are moist.   Eyes:      General: No scleral icterus.     Conjunctiva/sclera: Conjunctivae normal.   Neck:      Thyroid: No thyromegaly.      Vascular: No carotid bruit, hepatojugular reflux or JVD.      Trachea: Trachea normal.   Cardiovascular:      Rate and Rhythm: Normal rate and regular rhythm. No  extrasystoles are present.     Chest Wall: PMI is not displaced.      Pulses:           Carotid pulses are 2+ on the right side and 2+ on the left side.       Radial pulses are 2+ on the right side and 2+ on the left side.        Dorsalis pedis pulses are 2+ on the right side and 2+ on the left side.        Posterior tibial pulses are 2+ on the right side and 2+ on the left side.      Heart sounds: S1 normal and S2 normal. No murmur heard.    No friction rub. No S3 or S4 sounds.   Pulmonary:      Effort: Pulmonary effort is normal. No tachypnea, bradypnea, accessory muscle usage or respiratory distress.      Breath sounds: Normal breath sounds and air entry. No decreased breath sounds, wheezing, rhonchi or rales.   Abdominal:      General: Bowel sounds are normal. There is no distension or abdominal bruit.      Palpations: Abdomen is soft. There is no hepatomegaly, splenomegaly or pulsatile mass.      Tenderness: There is no abdominal tenderness.   Musculoskeletal:         General: No tenderness or deformity.      Right lower leg: No edema.      Left lower leg: No edema.   Skin:     General: Skin is warm and dry.      Capillary Refill: Capillary refill takes less than 2 seconds.      Coloration: Skin is not cyanotic or pale.      Nails: There is no clubbing.   Neurological:      General: No focal deficit present.      Mental Status: He is alert and oriented to person, place, and time.   Psychiatric:         Attention and Perception: Attention normal.         Mood and Affect: Mood normal.         Speech: Speech normal.         Behavior: Behavior normal. Behavior is cooperative.       Labs  Lab Visit on 03/27/2023   Component Date Value Ref Range Status    PSA, Screen 03/27/2023 0.42  0.00 - 4.00 ng/mL Final    Comment: The testing method is a chemiluminescent microparticle immunoassay   manufactured by [employer] and performed on the [employer]   or   [employer] system. Values obtained with different assay  manufacturers   for   methods may be different and cannot be used interchangeably.  PSA Expected levels:  Hormonal Therapy: <0.05 ng/ml  Prostatectomy: <0.01 ng/ml  Radiation Therapy: <1.00 ng/ml      Sodium 03/27/2023 141  136 - 145 mmol/L Final    Potassium 03/27/2023 4.9  3.5 - 5.1 mmol/L Final    Chloride 03/27/2023 107  95 - 110 mmol/L Final    CO2 03/27/2023 28  23 - 29 mmol/L Final    Glucose 03/27/2023 92  70 - 110 mg/dL Final    BUN 03/27/2023 14  8 - 23 mg/dL Final    Creatinine 03/27/2023 0.9  0.5 - 1.4 mg/dL Final    Calcium 03/27/2023 9.5  8.7 - 10.5 mg/dL Final    Total Protein 03/27/2023 7.4  6.0 - 8.4 g/dL Final    Albumin 03/27/2023 4.1  3.5 - 5.2 g/dL Final    Total Bilirubin 03/27/2023 0.5  0.1 - 1.0 mg/dL Final    Comment: For infants and newborns, interpretation of results should be based  on gestational age, weight and in agreement with clinical  observations.    Premature Infant recommended reference ranges:  Up to 24 hours.............<8.0 mg/dL  Up to 48 hours............<12.0 mg/dL  3-5 days..................<15.0 mg/dL  6-29 days.................<15.0 mg/dL      Alkaline Phosphatase 03/27/2023 58  55 - 135 U/L Final    AST 03/27/2023 29  10 - 40 U/L Final    ALT 03/27/2023 33  10 - 44 U/L Final    Anion Gap 03/27/2023 6 (L)  8 - 16 mmol/L Final    eGFR 03/27/2023 >60  >60 mL/min/1.73 m^2 Final    TSH 03/27/2023 1.151  0.400 - 4.000 uIU/mL Final    Cholesterol 03/27/2023 221 (H)  120 - 199 mg/dL Final    Comment: The National Cholesterol Education Program (NCEP) has set the  following guidelines (reference ranges) for Cholesterol:  Optimal.....................<200 mg/dL  Borderline High.............200-239 mg/dL  High........................> or = 240 mg/dL      Triglycerides 03/27/2023 123  30 - 150 mg/dL Final    Comment: The National Cholesterol Education Program (NCEP) has set the  following guidelines (reference values) for triglycerides:  Normal......................<150  mg/dL  Borderline High.............150-199 mg/dL  High........................200-499 mg/dL      HDL 03/27/2023 43  40 - 75 mg/dL Final    Comment: The National Cholesterol Education Program (NCEP) has set the  following guidelines (reference values) for HDL Cholesterol:  Low...............<40 mg/dL  Optimal...........>60 mg/dL      LDL Cholesterol 03/27/2023 153.4  63.0 - 159.0 mg/dL Final    Comment: The National Cholesterol Education Program (NCEP) has set the  following guidelines (reference values) for LDL Cholesterol:  Optimal.......................<130 mg/dL  Borderline High...............130-159 mg/dL  High..........................160-189 mg/dL  Very High.....................>190 mg/dL      HDL/Cholesterol Ratio 03/27/2023 19.5 (L)  20.0 - 50.0 % Final    Total Cholesterol/HDL Ratio 03/27/2023 5.1 (H)  2.0 - 5.0 Final    Non-HDL Cholesterol 03/27/2023 178  mg/dL Final    Comment: Risk category and Non-HDL cholesterol goals:  Coronary heart disease (CHD)or equivalent (10-year risk of CHD >20%):  Non-HDL cholesterol goal     <130 mg/dL  Two or more CHD risk factors and 10-year risk of CHD <= 20%:  Non-HDL cholesterol goal     <160 mg/dL  0 to 1 CHD risk factor:  Non-HDL cholesterol goal     <190 mg/dL      Hemoglobin A1C 03/27/2023 5.6  4.0 - 5.6 % Final    Comment: ADA Screening Guidelines:  5.7-6.4%  Consistent with prediabetes  >or=6.5%  Consistent with diabetes    High levels of fetal hemoglobin interfere with the HbA1C  assay. Heterozygous hemoglobin variants (HbS, HgC, etc)do  not significantly interfere with this assay.   However, presence of multiple variants may affect accuracy.      Estimated Avg Glucose 03/27/2023 114  68 - 131 mg/dL Final    WBC 03/27/2023 6.76  3.90 - 12.70 K/uL Final    RBC 03/27/2023 5.15  4.60 - 6.20 M/uL Final    Hemoglobin 03/27/2023 15.7  14.0 - 18.0 g/dL Final    Hematocrit 03/27/2023 47.1  40.0 - 54.0 % Final    MCV 03/27/2023 92  82 - 98 fL Final    MCH 03/27/2023 30.5   27.0 - 31.0 pg Final    MCHC 03/27/2023 33.3  32.0 - 36.0 g/dL Final    RDW 03/27/2023 12.8  11.5 - 14.5 % Final    Platelets 03/27/2023 237  150 - 450 K/uL Final    MPV 03/27/2023 9.9  9.2 - 12.9 fL Final    Immature Granulocytes 03/27/2023 0.1  0.0 - 0.5 % Final    Gran # (ANC) 03/27/2023 3.3  1.8 - 7.7 K/uL Final    Immature Grans (Abs) 03/27/2023 0.01  0.00 - 0.04 K/uL Final    Comment: Mild elevation in immature granulocytes is non specific and   can be seen in a variety of conditions including stress response,   acute inflammation, trauma and pregnancy. Correlation with other   laboratory and clinical findings is essential.      Lymph # 03/27/2023 2.5  1.0 - 4.8 K/uL Final    Mono # 03/27/2023 0.7  0.3 - 1.0 K/uL Final    Eos # 03/27/2023 0.2  0.0 - 0.5 K/uL Final    Baso # 03/27/2023 0.11  0.00 - 0.20 K/uL Final    nRBC 03/27/2023 0  0 /100 WBC Final    Gran % 03/27/2023 48.4  38.0 - 73.0 % Final    Lymph % 03/27/2023 36.2  18.0 - 48.0 % Final    Mono % 03/27/2023 10.9  4.0 - 15.0 % Final    Eosinophil % 03/27/2023 2.8  0.0 - 8.0 % Final    Basophil % 03/27/2023 1.6  0.0 - 1.9 % Final    Differential Method 03/27/2023 Automated   Final    HIV 1/2 Ag/Ab 03/27/2023 Non-reactive  Non-reactive Final       Imaging  No results found.    Assessment  1. Abnormal EKG  Septal infarct can not be ruled out but seems unlikely based on his history  - Ambulatory referral/consult to Cardiology  - Echo; Future    2. Dyspnea on exertion  - Ambulatory referral/consult to Cardiology  - Exercise Stress - EKG; Future      Plan and Discussion    Discussed his electrocardiogram.  I do not think the septal Q-waves represent a prior infarct.  Echocardiogram to assess left ventricular contractility.  Stress test to rule out ischemic heart disease as etiology of his dyspnea.  Further planning based on those results.  Discussed importance of risk factor modification including diet and lifestyle to help with his LDL cholesterol.    The  10-year ASCVD risk score (Mike STRANGE, et al., 2019) is: 14.8%    Values used to calculate the score:      Age: 67 years      Sex: Male      Is Non- : No      Diabetic: No      Tobacco smoker: No      Systolic Blood Pressure: 118 mmHg      Is BP treated: No      HDL Cholesterol: 43 mg/dL      Total Cholesterol: 221 mg/dL     Follow Up  Follow up for after testing.      Alan Cuello MD

## 2023-05-16 ENCOUNTER — PATIENT OUTREACH (OUTPATIENT)
Dept: ADMINISTRATIVE | Facility: HOSPITAL | Age: 68
End: 2023-05-16
Payer: MEDICARE

## 2023-05-24 ENCOUNTER — HOSPITAL ENCOUNTER (OUTPATIENT)
Dept: CARDIOLOGY | Facility: OTHER | Age: 68
Discharge: HOME | End: 2023-05-24
Attending: INTERNAL MEDICINE
Payer: MEDICARE

## 2023-05-24 ENCOUNTER — PATIENT MESSAGE (OUTPATIENT)
Dept: CARDIOLOGY | Facility: CLINIC | Age: 68
End: 2023-05-24

## 2023-05-24 VITALS
HEART RATE: 81 BPM | HEIGHT: 68 IN | SYSTOLIC BLOOD PRESSURE: 118 MMHG | BODY MASS INDEX: 27.13 KG/M2 | DIASTOLIC BLOOD PRESSURE: 72 MMHG | WEIGHT: 179 LBS

## 2023-05-24 DIAGNOSIS — R94.31 ABNORMAL EKG: ICD-10-CM

## 2023-05-24 DIAGNOSIS — R06.09 DYSPNEA ON EXERTION: ICD-10-CM

## 2023-05-24 LAB
ASCENDING AORTA: 3.47 CM
AV INDEX (PROSTH): 1
AV MEAN GRADIENT: 3 MMHG
AV PEAK GRADIENT: 6 MMHG
AV VALVE AREA: 3.85 CM2
AV VELOCITY RATIO: 0.87
BSA FOR ECHO PROCEDURE: 1.97 M2
CV ECHO LV RWT: 0.3 CM
CV STRESS BASE HR: 62 BPM
DIASTOLIC BLOOD PRESSURE: 70 MMHG
DOP CALC AO PEAK VEL: 1.26 M/S
DOP CALC AO VTI: 25.2 CM
DOP CALC LVOT AREA: 3.9 CM2
DOP CALC LVOT DIAMETER: 2.22 CM
DOP CALC LVOT PEAK VEL: 1.09 M/S
DOP CALC LVOT STROKE VOLUME: 97.11 CM3
DOP CALC RVOT PEAK VEL: 0.8 M/S
DOP CALC RVOT VTI: 17 CM
DOP CALCLVOT PEAK VEL VTI: 25.1 CM
E WAVE DECELERATION TIME: 262.35 MSEC
E/A RATIO: 0.81
E/E' RATIO: 6.73 M/S
ECHO LV POSTERIOR WALL: 0.71 CM (ref 0.6–1.1)
EJECTION FRACTION: 55 %
FRACTIONAL SHORTENING: 32 % (ref 28–44)
INTERVENTRICULAR SEPTUM: 0.64 CM (ref 0.6–1.1)
IVC DIAMETER: 1.25 CM
IVRT: 91.34 MSEC
LA MAJOR: 4.5 CM
LA MINOR: 4.57 CM
LA WIDTH: 3.2 CM
LEFT ATRIUM SIZE: 3.6 CM
LEFT ATRIUM VOLUME INDEX MOD: 23.6 ML/M2
LEFT ATRIUM VOLUME INDEX: 22.8 ML/M2
LEFT ATRIUM VOLUME MOD: 46 CM3
LEFT ATRIUM VOLUME: 44.4 CM3
LEFT INTERNAL DIMENSION IN SYSTOLE: 3.15 CM (ref 2.1–4)
LEFT VENTRICLE DIASTOLIC VOLUME INDEX: 51.56 ML/M2
LEFT VENTRICLE DIASTOLIC VOLUME: 100.54 ML
LEFT VENTRICLE MASS INDEX: 50 G/M2
LEFT VENTRICLE SYSTOLIC VOLUME INDEX: 20.2 ML/M2
LEFT VENTRICLE SYSTOLIC VOLUME: 39.47 ML
LEFT VENTRICULAR INTERNAL DIMENSION IN DIASTOLE: 4.66 CM (ref 3.5–6)
LEFT VENTRICULAR MASS: 97.02 G
LV LATERAL E/E' RATIO: 5.29 M/S
LV SEPTAL E/E' RATIO: 9.25 M/S
LVOT MG: 2.65 MMHG
LVOT MV: 0.77 CM/S
MV PEAK A VEL: 0.91 M/S
MV PEAK E VEL: 0.74 M/S
MV STENOSIS PRESSURE HALF TIME: 76.08 MS
MV VALVE AREA P 1/2 METHOD: 2.89 CM2
OHS CV CPX 85 PERCENT MAX PREDICTED HEART RATE MALE: 130
OHS CV CPX ESTIMATED METS: 13
OHS CV CPX MAX PREDICTED HEART RATE: 153
OHS CV CPX PATIENT IS FEMALE: 0
OHS CV CPX PATIENT IS MALE: 1
OHS CV CPX PEAK DIASTOLIC BLOOD PRESSURE: 78 MMHG
OHS CV CPX PEAK HEAR RATE: 179 BPM
OHS CV CPX PEAK RATE PRESSURE PRODUCT: NORMAL
OHS CV CPX PEAK SYSTOLIC BLOOD PRESSURE: 193 MMHG
OHS CV CPX PERCENT MAX PREDICTED HEART RATE ACHIEVED: 117
OHS CV CPX RATE PRESSURE PRODUCT PRESENTING: 7254
PISA MRMAX VEL: 2.08 M/S
PISA TR MAX VEL: 2.01 M/S
PULM VEIN S/D RATIO: 1.72
PV MEAN GRADIENT: 1.39 MMHG
PV PEAK D VEL: 0.29 M/S
PV PEAK S VEL: 0.5 M/S
PV PEAK VELOCITY: 0.95 CM/S
RA MAJOR: 4.67 CM
RA PRESSURE: 3 MMHG
RA WIDTH: 2.8 CM
RIGHT VENTRICULAR END-DIASTOLIC DIMENSION: 3.1 CM
RV TISSUE DOPPLER FREE WALL SYSTOLIC VELOCITY 1 (APICAL 4 CHAMBER VIEW): 16 CM/S
SINUS: 3 CM
STJ: 3.31 CM
STRESS ECHO POST EXERCISE DUR MIN: 10 MINUTES
STRESS ECHO POST EXERCISE DUR SEC: 0 SECONDS
SYSTOLIC BLOOD PRESSURE: 117 MMHG
TDI LATERAL: 0.14 M/S
TDI SEPTAL: 0.08 M/S
TDI: 0.11 M/S
TR MAX PG: 16 MMHG
TRICUSPID ANNULAR PLANE SYSTOLIC EXCURSION: 2 CM
TV REST PULMONARY ARTERY PRESSURE: 19 MMHG

## 2023-05-24 PROCEDURE — 93018 EXERCISE STRESS - EKG (CUPID ONLY): ICD-10-PCS | Mod: ,,, | Performed by: INTERNAL MEDICINE

## 2023-05-24 PROCEDURE — 93017 CV STRESS TEST TRACING ONLY: CPT

## 2023-05-24 PROCEDURE — 93306 ECHO (CUPID ONLY): ICD-10-PCS | Mod: 26,,, | Performed by: INTERNAL MEDICINE

## 2023-05-24 PROCEDURE — 93018 CV STRESS TEST I&R ONLY: CPT | Mod: ,,, | Performed by: INTERNAL MEDICINE

## 2023-05-24 PROCEDURE — 93306 TTE W/DOPPLER COMPLETE: CPT | Mod: 26,,, | Performed by: INTERNAL MEDICINE

## 2023-05-24 PROCEDURE — 93306 TTE W/DOPPLER COMPLETE: CPT

## 2023-05-24 PROCEDURE — 93016 CV STRESS TEST SUPVJ ONLY: CPT | Mod: ,,, | Performed by: INTERNAL MEDICINE

## 2023-05-24 PROCEDURE — 93016 EXERCISE STRESS - EKG (CUPID ONLY): ICD-10-PCS | Mod: ,,, | Performed by: INTERNAL MEDICINE

## 2023-05-30 ENCOUNTER — OFFICE VISIT (OUTPATIENT)
Dept: INTERNAL MEDICINE | Facility: CLINIC | Age: 68
End: 2023-05-30
Payer: MEDICARE

## 2023-05-30 VITALS
OXYGEN SATURATION: 99 % | HEART RATE: 78 BPM | SYSTOLIC BLOOD PRESSURE: 124 MMHG | WEIGHT: 172.38 LBS | DIASTOLIC BLOOD PRESSURE: 78 MMHG | BODY MASS INDEX: 26.21 KG/M2

## 2023-05-30 DIAGNOSIS — Z91.09 ENVIRONMENTAL ALLERGIES: ICD-10-CM

## 2023-05-30 DIAGNOSIS — Z00.00 HEALTH MAINTENANCE EXAMINATION: Primary | ICD-10-CM

## 2023-05-30 DIAGNOSIS — Z91.89 FRAMINGHAM CARDIAC RISK 10-20% IN NEXT 10 YEARS: ICD-10-CM

## 2023-05-30 DIAGNOSIS — Z23 NEED FOR VACCINATION: ICD-10-CM

## 2023-05-30 PROCEDURE — 99214 PR OFFICE/OUTPT VISIT, EST, LEVL IV, 30-39 MIN: ICD-10-PCS | Mod: S$PBB,,, | Performed by: STUDENT IN AN ORGANIZED HEALTH CARE EDUCATION/TRAINING PROGRAM

## 2023-05-30 PROCEDURE — 99214 OFFICE O/P EST MOD 30 MIN: CPT | Mod: S$PBB,,, | Performed by: STUDENT IN AN ORGANIZED HEALTH CARE EDUCATION/TRAINING PROGRAM

## 2023-05-30 PROCEDURE — 99213 OFFICE O/P EST LOW 20 MIN: CPT | Mod: PBBFAC | Performed by: STUDENT IN AN ORGANIZED HEALTH CARE EDUCATION/TRAINING PROGRAM

## 2023-05-30 PROCEDURE — 99999 PR PBB SHADOW E&M-EST. PATIENT-LVL III: ICD-10-PCS | Mod: PBBFAC,,, | Performed by: STUDENT IN AN ORGANIZED HEALTH CARE EDUCATION/TRAINING PROGRAM

## 2023-05-30 PROCEDURE — 99999 PR PBB SHADOW E&M-EST. PATIENT-LVL III: CPT | Mod: PBBFAC,,, | Performed by: STUDENT IN AN ORGANIZED HEALTH CARE EDUCATION/TRAINING PROGRAM

## 2023-05-30 RX ORDER — MONTELUKAST SODIUM 10 MG/1
10 TABLET ORAL NIGHTLY
Qty: 30 TABLET | Refills: 2 | Status: SHIPPED | OUTPATIENT
Start: 2023-05-30 | End: 2023-12-06

## 2023-05-30 NOTE — PROGRESS NOTES
Subjective:       Patient ID: Edvin Monzon is a 67 y.o. male.    Chief Complaint: Health maintenance examination [Z00.00]    Patient is established with me, here today for the following:    Health maintenance -   Cologuard negative JULY2021.  Denies family history of colorectal cancer.  Denies significant family history of cardiac disease.  Denies family history of prostate cancer.  Lab Results       Component                Value               Date                       PSA                      0.42                03/27/2023            UTD on influenza, Tdap, COVID primary/bivalent vaccinations.  Due for PCV20, shingles vaccinations.  Never smoker.  Drinks alcohol 3-5 times weekly, 1-2 drinks per sitting.  Rare marijuana use.  Completed HIV and hepatitis C screening.  Due for diabetes screening.  Lab Results       Component                Value               Date                       HGBA1C                   5.6                 03/27/2023            Endorses overall healthy diet.   Eating plenty of fresh vegetables, fruits.  Eating mostly lean proteins.   Eating whole grains, limits processed foods.  Eats mostly at home.  Endorses exercising routinely.  Bike riding for cardio, most days.  Floor exercises as well.  Endorses active lifestyle.    Wt Readings from Last 4 Encounters:  05/30/23 : 78.2 kg (172 lb 6.4 oz)  05/24/23 : 81.2 kg (179 lb)  04/11/23 : 81.2 kg (179 lb)  03/27/23 : 82 kg (180 lb 12.4 oz)    PFT's without significant abnormalities APR2023  Using for azelastine with good effect  Notes improvement in SOB with weight reduction     Followed with Dr. Cuello for cardiology  Underwent stress without evidence of ischemia  Echo EF 55%, indeterminate diastolic function    Elevated ASCVD risk score -   UTD on lipid screening.  Has been making healthful diet and exercise changes  Lab Results       Component                Value               Date                       LDLCALC                  153.4                03/27/2023            The 10-year ASCVD risk score (Mike STRANGE, et al., 2019) is: 14.8%      Review of Systems   Constitutional:  Negative for appetite change, chills, fatigue, fever and unexpected weight change.   Respiratory:  Negative for cough and shortness of breath.    Cardiovascular:  Negative for chest pain, palpitations and leg swelling.   Gastrointestinal:  Negative for abdominal pain, constipation, diarrhea, nausea and vomiting.   Genitourinary:  Negative for difficulty urinating and frequency.   Skin:  Negative for rash.   Neurological:  Negative for dizziness, syncope, weakness and headaches.       Current Outpatient Medications   Medication Instructions    ascorbic acid (vitamin C) (VITAMIN C) 1,000 mg, Oral, Daily    azelastine (ASTELIN) 137 mcg, Nasal, 2 times daily    b complex vitamins tablet 1 tablet, Oral, Daily     Objective:      Vitals:    05/30/23 1102   BP: 124/78   Pulse: 78   SpO2: 99%   Weight: 78.2 kg (172 lb 6.4 oz)   PainSc: 0-No pain     Body mass index is 26.21 kg/m².    Physical Exam  Vitals reviewed.   Constitutional:       General: He is not in acute distress.     Appearance: Normal appearance. He is not ill-appearing or diaphoretic.   HENT:      Head: Normocephalic and atraumatic.      Right Ear: Tympanic membrane, ear canal and external ear normal. There is no impacted cerumen.      Left Ear: Tympanic membrane, ear canal and external ear normal. There is no impacted cerumen.      Nose: Nose normal. No rhinorrhea.      Mouth/Throat:      Mouth: Mucous membranes are moist.      Pharynx: Oropharynx is clear. No oropharyngeal exudate or posterior oropharyngeal erythema.   Eyes:      General: No scleral icterus.        Right eye: No discharge.         Left eye: No discharge.      Conjunctiva/sclera: Conjunctivae normal.   Neck:      Thyroid: No thyromegaly or thyroid tenderness.      Trachea: Trachea normal.   Cardiovascular:      Rate and Rhythm: Normal rate and regular rhythm.       Heart sounds: Normal heart sounds. No murmur heard.    No friction rub. No gallop.   Pulmonary:      Effort: Pulmonary effort is normal. No respiratory distress.      Breath sounds: Normal breath sounds. No stridor. No wheezing, rhonchi or rales.   Abdominal:      General: Bowel sounds are normal. There is no distension.      Palpations: Abdomen is soft.      Tenderness: There is no abdominal tenderness. There is no guarding or rebound.   Musculoskeletal:         General: No swelling or deformity.      Cervical back: Neck supple.   Lymphadenopathy:      Head:      Right side of head: No submandibular or posterior auricular adenopathy.      Left side of head: No submandibular or posterior auricular adenopathy.      Cervical: No cervical adenopathy.      Right cervical: No superficial, deep or posterior cervical adenopathy.     Left cervical: No superficial, deep or posterior cervical adenopathy.      Upper Body:      Right upper body: No supraclavicular adenopathy.      Left upper body: No supraclavicular adenopathy.   Skin:     General: Skin is warm and dry.   Neurological:      General: No focal deficit present.      Mental Status: He is alert. Mental status is at baseline.      Gait: Gait normal.   Psychiatric:         Mood and Affect: Mood normal.         Behavior: Behavior normal.       Assessment:       1. Health maintenance examination    2. Environmental allergies    3. [place] cardiac risk 10-20% in next 10 years    4. Need for vaccination        Plan:       Environmental allergies  Continue azelastine PRN  Start Singulair nightly  RTC in 6 months for follow up  -     montelukast (SINGULAIR) 10 mg tablet; Take 1 tablet (10 mg total) by mouth every evening.    [place] cardiac risk 10-20% in next 10 years  Declines statin therapy at this time  Provided recommendations for diet and exercise  Trial of red yeast rice  RTC in 6 months for follow up    Health maintenance examination  Reviewed and  discussed age appropriate screenings and immunizations.    Need for vaccination  Provided written Rx for shingles, PCV20 vaccination, advised to obtain at [facility] pharmacy on 2nd floor or preferred pharmacy.      Chayo Davidson MD  5/30/2023

## 2023-05-30 NOTE — PATIENT INSTRUCTIONS
"Here are some general recommendations for dietary and lifestyle modifications to help improve your cholesterol.   - Focus fat intake on "healthy fats" (monounsaturated and polyunsaturated) such as avocados, oils (olive, peanut, sesame, safflower), nuts, and cold-water fish (herring, salmon, cod, lake trout, mackerel, sardines).   - Limit your saturated fats that come from things like meats (even lean like chicken and turkey), butter, and tropical oils (coconut, palm).   - Definitely avoid hydrogenated oils and trans fats.  - Decrease your red meat consumption such as beef and pork to no more than once weekly.   - Set a goal of at least 150 minutes per week of aerobic exercise such as jogging, biking, or swimming. Try to exercise at least 4-5 days weekly.   - Avoid simple sugars such as sweets and sodas. Chose carbohydrate sources that are whole grains such as quinoa, farro, brown rice, whole wheat pasta, etc.   - Avoid or limit alcohol consumption as much as possible.      You may also consider trying red yeast rice 1,200 mg daily which is an over the counter supplement that can help cholesterol. Ensure that you choose a brand that does not contain monacolin K or citrinin.    "

## 2023-05-31 ENCOUNTER — OFFICE VISIT (OUTPATIENT)
Dept: DERMATOLOGY | Facility: CLINIC | Age: 68
End: 2023-05-31
Payer: MEDICARE

## 2023-05-31 VITALS — WEIGHT: 172 LBS | BODY MASS INDEX: 26.15 KG/M2

## 2023-05-31 DIAGNOSIS — L81.4 LENTIGINES: ICD-10-CM

## 2023-05-31 DIAGNOSIS — L82.1 SEBORRHEIC KERATOSES: ICD-10-CM

## 2023-05-31 DIAGNOSIS — Z12.83 SKIN EXAM, SCREENING FOR CANCER: ICD-10-CM

## 2023-05-31 DIAGNOSIS — D18.01 CHERRY ANGIOMA: ICD-10-CM

## 2023-05-31 DIAGNOSIS — L57.0 MULTIPLE ACTINIC KERATOSES: Primary | ICD-10-CM

## 2023-05-31 PROCEDURE — 99213 OFFICE O/P EST LOW 20 MIN: CPT | Mod: PBBFAC,PO | Performed by: DERMATOLOGY

## 2023-05-31 PROCEDURE — 99999 PR PBB SHADOW E&M-EST. PATIENT-LVL III: CPT | Mod: PBBFAC,,, | Performed by: DERMATOLOGY

## 2023-05-31 PROCEDURE — 99214 PR OFFICE/OUTPT VISIT, EST, LEVL IV, 30-39 MIN: ICD-10-PCS | Mod: S$PBB,,, | Performed by: DERMATOLOGY

## 2023-05-31 PROCEDURE — 99214 OFFICE O/P EST MOD 30 MIN: CPT | Mod: S$PBB,,, | Performed by: DERMATOLOGY

## 2023-05-31 PROCEDURE — 99999 PR PBB SHADOW E&M-EST. PATIENT-LVL III: ICD-10-PCS | Mod: PBBFAC,,, | Performed by: DERMATOLOGY

## 2023-05-31 RX ORDER — FLUOROURACIL 50 MG/G
CREAM TOPICAL
Qty: 40 G | Refills: 3 | Status: SHIPPED | OUTPATIENT
Start: 2023-05-31 | End: 2023-12-06

## 2023-05-31 NOTE — PROGRESS NOTES
Subjective:      Patient ID:  Edvin Monzon is a 67 y.o. male who presents for   Chief Complaint   Patient presents with    Follow-up     UBSE     The area of actinic damage has recurred on his left cheek. Would like skin check.     Follow-up - Follow-up  Affected locations: face, scalp, left arm, right arm, chest, torso, abdomen and back  Signs / symptoms: asymptomatic    Review of Systems   Constitutional:  Negative for fever, chills, weight loss, weight gain, fatigue, night sweats and malaise.   Skin:  Positive for daily sunscreen use and activity-related sunscreen use.   Hematologic/Lymphatic: Does not bruise/bleed easily.     Objective:   Physical Exam   Constitutional: He appears well-developed and well-nourished. No distress.   Neurological: He is alert and oriented to person, place, and time. He is not disoriented.   Psychiatric: He has a normal mood and affect.   Skin:   Areas Examined (abnormalities noted in diagram):   Head / Face Inspection Performed  Neck Inspection Performed  Chest / Axilla Inspection Performed  Abdomen Inspection Performed  Back Inspection Performed  RUE Inspected  LUE Inspection Performed  RLE Inspected  LLE Inspection Performed               Diagram Legend     Erythematous scaling macule/papule c/w actinic keratosis       Vascular papule c/w angioma      Pigmented verrucoid papule/plaque c/w seborrheic keratosis      Yellow umbilicated papule c/w sebaceous hyperplasia      Irregularly shaped tan macule c/w lentigo     1-2 mm smooth white papules consistent with Milia      Movable subcutaneous cyst with punctum c/w epidermal inclusion cyst      Subcutaneous movable cyst c/w pilar cyst      Firm pink to brown papule c/w dermatofibroma      Pedunculated fleshy papule(s) c/w skin tag(s)      Evenly pigmented macule c/w junctional nevus     Mildly variegated pigmented, slightly irregular-bordered macule c/w mildly atypical nevus      Flesh colored to evenly pigmented papule c/w  "intradermal nevus       Pink pearly papule/plaque c/w basal cell carcinoma      Erythematous hyperkeratotic cursted plaque c/w SCC      Surgical scar with no sign of skin cancer recurrence      Open and closed comedones      Inflammatory papules and pustules      Verrucoid papule consistent consistent with wart     Erythematous eczematous patches and plaques     Dystrophic onycholytic nail with subungual debris c/w onychomycosis     Umbilicated papule    Erythematous-base heme-crusted tan verrucoid plaque consistent with inflamed seborrheic keratosis     Erythematous Silvery Scaling Plaque c/w Psoriasis     See annotation      Assessment / Plan:        Multiple actinic keratoses  -     fluorouraciL (EFUDEX) 5 % cream; Use hs for 2 weeks  Dispense: 40 g; Refill: 3 use MWF for area of actinic damage and recurrent ak s on left cheek    Cherry angiomas  This is a benign vascular lesion. Reassurance given. No treatment required.       Lentigines  The "ABCD" rules to observe pigmented lesions were reviewed.      Seborrheic keratoses  Seborrheic keratosis scattered, told benign no treatment needed.  Brochure provided.      Skin exam, screening for cancer  No lesions suspicious for malignancy noted.    Recommend daily sun protection/avoidance and use of at least SPF 30, broad spectrum sunscreen (OTC drug).                Follow up in about 6 months (around 11/30/2023).  "

## 2023-06-09 ENCOUNTER — PATIENT MESSAGE (OUTPATIENT)
Dept: DERMATOLOGY | Facility: CLINIC | Age: 68
End: 2023-06-09
Payer: MEDICARE

## 2023-10-09 ENCOUNTER — TELEPHONE (OUTPATIENT)
Dept: OTOLARYNGOLOGY | Facility: CLINIC | Age: 68
End: 2023-10-09
Payer: MEDICARE

## 2023-12-06 ENCOUNTER — CLINICAL SUPPORT (OUTPATIENT)
Dept: AUDIOLOGY | Facility: CLINIC | Age: 68
End: 2023-12-06
Payer: MEDICARE

## 2023-12-06 ENCOUNTER — OFFICE VISIT (OUTPATIENT)
Dept: OTOLARYNGOLOGY | Facility: CLINIC | Age: 68
End: 2023-12-06
Payer: MEDICARE

## 2023-12-06 VITALS — SYSTOLIC BLOOD PRESSURE: 128 MMHG | HEART RATE: 84 BPM | DIASTOLIC BLOOD PRESSURE: 85 MMHG

## 2023-12-06 DIAGNOSIS — H90.3 SENSORINEURAL HEARING LOSS (SNHL) OF BOTH EARS: ICD-10-CM

## 2023-12-06 DIAGNOSIS — H61.23 IMPACTED CERUMEN, BILATERAL: ICD-10-CM

## 2023-12-06 DIAGNOSIS — H90.3 SENSORINEURAL HEARING LOSS (SNHL) OF BOTH EARS: Primary | ICD-10-CM

## 2023-12-06 DIAGNOSIS — H93.13 TINNITUS, BILATERAL: ICD-10-CM

## 2023-12-06 DIAGNOSIS — J30.89 NON-SEASONAL ALLERGIC RHINITIS, UNSPECIFIED TRIGGER: Primary | ICD-10-CM

## 2023-12-06 DIAGNOSIS — Z86.69 HISTORY OF HEARING LOSS: ICD-10-CM

## 2023-12-06 DIAGNOSIS — H93.299 IMPAIRMENT OF SPEECH DISCRIMINATION: ICD-10-CM

## 2023-12-06 PROCEDURE — 99999 PR PBB SHADOW E&M-EST. PATIENT-LVL III: ICD-10-PCS | Mod: PBBFAC,,, | Performed by: OTOLARYNGOLOGY

## 2023-12-06 PROCEDURE — 99999 PR PBB SHADOW E&M-EST. PATIENT-LVL III: CPT | Mod: PBBFAC,,, | Performed by: OTOLARYNGOLOGY

## 2023-12-06 PROCEDURE — 99213 PR OFFICE/OUTPT VISIT, EST, LEVL III, 20-29 MIN: ICD-10-PCS | Mod: 25,S$PBB,, | Performed by: OTOLARYNGOLOGY

## 2023-12-06 PROCEDURE — 99213 OFFICE O/P EST LOW 20 MIN: CPT | Mod: PBBFAC | Performed by: OTOLARYNGOLOGY

## 2023-12-06 PROCEDURE — 69210 REMOVE IMPACTED EAR WAX UNI: CPT | Mod: S$PBB,,, | Performed by: OTOLARYNGOLOGY

## 2023-12-06 PROCEDURE — 99213 OFFICE O/P EST LOW 20 MIN: CPT | Mod: 25,S$PBB,, | Performed by: OTOLARYNGOLOGY

## 2023-12-06 PROCEDURE — 92557 COMPREHENSIVE HEARING TEST: CPT | Mod: PBBFAC

## 2023-12-06 PROCEDURE — 69210 PR REMOVAL IMPACTED CERUMEN REQUIRING INSTRUMENTATION, UNILATERAL: ICD-10-PCS | Mod: S$PBB,,, | Performed by: OTOLARYNGOLOGY

## 2023-12-06 PROCEDURE — 69210 REMOVE IMPACTED EAR WAX UNI: CPT | Mod: PBBFAC | Performed by: OTOLARYNGOLOGY

## 2023-12-06 PROCEDURE — 92567 TYMPANOMETRY: CPT | Mod: PBBFAC

## 2023-12-06 RX ORDER — FLUTICASONE PROPIONATE 50 MCG
2 SPRAY, SUSPENSION (ML) NASAL DAILY
Qty: 16 G | Refills: 2 | Status: SHIPPED | OUTPATIENT
Start: 2023-12-06 | End: 2024-06-03

## 2023-12-06 NOTE — PATIENT INSTRUCTIONS
Reviewed history, symptoms and findings.    Reviewed ear care. Avoid q-tips or any instruments in ear canal. Consider use of a drop of baby oil or olive oil in the ear canal once a week to loosen wax and moisturize skin.    Based on hearing levels and discrimination, hearing aids were recommended. He can schedule with audiology ([phone]) for discussion of hearing aid options. He plans to seek assistance with [facility] audiology services.  In the least, I recommend another audiogram and audiologic evaluation and ear cleaning in one year. The patient knows to call the clinic for problems ([phone]) and expectations discussed.    Flonase refilled for the patient.    Otherwise, follow up with our clinic for any ear, nose or throat problems ([phone]).

## 2023-12-06 NOTE — PROGRESS NOTES
Edvin Monzon was seen today in the clinic for an audiologic evaluation.  Patient's main complaint was hearing loss.  Mr. Monzon reported that he has difficulty understanding speech when in background noise or reverberant/echoy environments. He experiences bothersome tinnitus in both ears as well as bilateral aural pressure. Mr. Monzon denied vertigo.     Tympanometry revealed Type A in the right ear and Type A in the left ear.     Audiogram results revealed normal hearing precipitously sloping to a mild sloping to profound sensorineural hearing loss in the right ear and normal hearing precipitously sloping to a mild sloping to severe sensorineural hearing loss in the left ear.      Speech reception thresholds were noted at 30 dB in the right ear and 25 dB in the left ear.    Speech discrimination scores were 80% in the right ear and 96% in the left ear.    Recommendations:  Otologic evaluation  Hearing aid consultation  Annual audiogram, or sooner if any changes in hearing are noted  Hearing protection when in noise
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
## 2024-02-27 DIAGNOSIS — Z00.00 ENCOUNTER FOR MEDICARE ANNUAL WELLNESS EXAM: ICD-10-CM

## 2024-04-29 ENCOUNTER — OFFICE VISIT (OUTPATIENT)
Dept: DERMATOLOGY | Facility: CLINIC | Age: 69
End: 2024-04-29
Payer: MEDICARE

## 2024-04-29 VITALS — BODY MASS INDEX: 26.15 KG/M2 | WEIGHT: 172 LBS

## 2024-04-29 DIAGNOSIS — L81.4 LENTIGINES: ICD-10-CM

## 2024-04-29 DIAGNOSIS — D22.9 NEVUS OF MULTIPLE SITES: ICD-10-CM

## 2024-04-29 DIAGNOSIS — D18.01 CHERRY ANGIOMA: ICD-10-CM

## 2024-04-29 DIAGNOSIS — B08.1 BATEMAN'S DISEASE: ICD-10-CM

## 2024-04-29 DIAGNOSIS — L71.9 ROSACEA: Primary | ICD-10-CM

## 2024-04-29 DIAGNOSIS — Z12.83 SKIN EXAM, SCREENING FOR CANCER: ICD-10-CM

## 2024-04-29 PROCEDURE — 99214 OFFICE O/P EST MOD 30 MIN: CPT | Mod: S$PBB,,, | Performed by: DERMATOLOGY

## 2024-04-29 PROCEDURE — 99213 OFFICE O/P EST LOW 20 MIN: CPT | Mod: PBBFAC,PO | Performed by: DERMATOLOGY

## 2024-04-29 PROCEDURE — 99999 PR PBB SHADOW E&M-EST. PATIENT-LVL III: CPT | Mod: PBBFAC,,, | Performed by: DERMATOLOGY

## 2024-04-29 RX ORDER — DOXYCYCLINE HYCLATE 100 MG
TABLET ORAL
Qty: 30 TABLET | Refills: 6 | Status: SHIPPED | OUTPATIENT
Start: 2024-04-29

## 2024-04-29 NOTE — PROGRESS NOTES
Subjective:      Patient ID:  Edvin Monzon is a 68 y.o. male who presents for   Chief Complaint   Patient presents with    Follow-up     UBSE     Would like skin check no lesions of concern. He used the efudex on his left cheek, worked well (see previous note)  Has chronic ocular rosacea causes tearing OD and has an irritant dermatitis under lower lid.      Follow-up - Follow-up  Affected locations: face, left arm, right arm, chest, torso, back and abdomen  Signs / symptoms: asymptomatic    Review of Systems   Constitutional:  Negative for fever, chills, weight loss, weight gain, fatigue, night sweats and malaise.   Skin:  Positive for daily sunscreen use, activity-related sunscreen use and wears hat.   Hematologic/Lymphatic: Does not bruise/bleed easily.       Objective:   Physical Exam   Constitutional: He appears well-developed and well-nourished. No distress.   Neurological: He is alert and oriented to person, place, and time. He is not disoriented.   Psychiatric: He has a normal mood and affect.   Skin:   Areas Examined (abnormalities noted in diagram):   Head / Face Inspection Performed  Neck Inspection Performed  Chest / Axilla Inspection Performed  Abdomen Inspection Performed  Back Inspection Performed  RUE Inspected  LUE Inspection Performed  RLE Inspected  LLE Inspection Performed                 Diagram Legend     Erythematous scaling macule/papule c/w actinic keratosis       Vascular papule c/w angioma      Pigmented verrucoid papule/plaque c/w seborrheic keratosis      Yellow umbilicated papule c/w sebaceous hyperplasia      Irregularly shaped tan macule c/w lentigo     1-2 mm smooth white papules consistent with Milia      Movable subcutaneous cyst with punctum c/w epidermal inclusion cyst      Subcutaneous movable cyst c/w pilar cyst      Firm pink to brown papule c/w dermatofibroma      Pedunculated fleshy papule(s) c/w skin tag(s)      Evenly pigmented macule c/w junctional nevus     Mildly  "variegated pigmented, slightly irregular-bordered macule c/w mildly atypical nevus      Flesh colored to evenly pigmented papule c/w intradermal nevus       Pink pearly papule/plaque c/w basal cell carcinoma      Erythematous hyperkeratotic cursted plaque c/w SCC      Surgical scar with no sign of skin cancer recurrence      Open and closed comedones      Inflammatory papules and pustules      Verrucoid papule consistent consistent with wart     Erythematous eczematous patches and plaques     Dystrophic onycholytic nail with subungual debris c/w onychomycosis     Umbilicated papule    Erythematous-base heme-crusted tan verrucoid plaque consistent with inflamed seborrheic keratosis     Erythematous Silvery Scaling Plaque c/w Psoriasis     See annotation      Assessment / Plan:        Rosacea  -     doxycycline (VIBRA-TABS) 100 MG tablet; Take one qd with food prn  Dispense: 30 tablet; Refill: 6  Take prn flares  Lyndsey's disease  reassurance    Cherry angiomas  This is a benign vascular lesion. Reassurance given. No treatment required.       Lentigines  The "ABCD" rules to observe pigmented lesions were reviewed.      Nevus of multiple sites  The "ABCD" rules to observe pigmented lesions were reviewed.      Skin exam, screening for cancer   No lesions suspicious for malignancy noted.    Recommend daily sun protection/avoidance and use of at least SPF 30, broad spectrum sunscreen (OTC drug).                Follow up in about 1 year (around 4/29/2025).  "

## 2024-09-03 ENCOUNTER — OFFICE VISIT (OUTPATIENT)
Dept: INTERNAL MEDICINE | Facility: CLINIC | Age: 69
End: 2024-09-03
Payer: MEDICARE

## 2024-09-03 VITALS
BODY MASS INDEX: 27.19 KG/M2 | HEART RATE: 76 BPM | WEIGHT: 178.81 LBS | SYSTOLIC BLOOD PRESSURE: 124 MMHG | OXYGEN SATURATION: 98 % | DIASTOLIC BLOOD PRESSURE: 80 MMHG

## 2024-09-03 DIAGNOSIS — E78.2 MIXED HYPERLIPIDEMIA: ICD-10-CM

## 2024-09-03 DIAGNOSIS — R73.09 ABNORMAL GLUCOSE: ICD-10-CM

## 2024-09-03 DIAGNOSIS — Z00.00 HEALTH MAINTENANCE EXAMINATION: Primary | ICD-10-CM

## 2024-09-03 DIAGNOSIS — M25.561 CHRONIC PAIN OF BOTH KNEES: ICD-10-CM

## 2024-09-03 DIAGNOSIS — Z12.12 SCREENING FOR COLORECTAL CANCER: ICD-10-CM

## 2024-09-03 DIAGNOSIS — E53.8 VITAMIN B12 DEFICIENCY: ICD-10-CM

## 2024-09-03 DIAGNOSIS — Z12.5 SCREENING FOR PROSTATE CANCER: ICD-10-CM

## 2024-09-03 DIAGNOSIS — Z23 NEED FOR VACCINATION: ICD-10-CM

## 2024-09-03 DIAGNOSIS — G89.29 CHRONIC PAIN OF BOTH KNEES: ICD-10-CM

## 2024-09-03 DIAGNOSIS — Z13.6 SCREENING FOR CARDIOVASCULAR CONDITION: ICD-10-CM

## 2024-09-03 DIAGNOSIS — M25.562 CHRONIC PAIN OF BOTH KNEES: ICD-10-CM

## 2024-09-03 DIAGNOSIS — Z12.11 SCREENING FOR COLORECTAL CANCER: ICD-10-CM

## 2024-09-03 PROCEDURE — 99999 PR PBB SHADOW E&M-EST. PATIENT-LVL III: CPT | Mod: PBBFAC,,, | Performed by: STUDENT IN AN ORGANIZED HEALTH CARE EDUCATION/TRAINING PROGRAM

## 2024-09-03 PROCEDURE — 99999PBSHW PR PBB SHADOW TECHNICAL ONLY FILED TO HB: Mod: PBBFAC,,,

## 2024-09-03 PROCEDURE — 99214 OFFICE O/P EST MOD 30 MIN: CPT | Mod: S$PBB,,, | Performed by: STUDENT IN AN ORGANIZED HEALTH CARE EDUCATION/TRAINING PROGRAM

## 2024-09-03 PROCEDURE — 99213 OFFICE O/P EST LOW 20 MIN: CPT | Mod: PBBFAC | Performed by: STUDENT IN AN ORGANIZED HEALTH CARE EDUCATION/TRAINING PROGRAM

## 2024-09-03 PROCEDURE — 90677 PCV20 VACCINE IM: CPT | Mod: PBBFAC

## 2024-09-03 PROCEDURE — G0009 ADMIN PNEUMOCOCCAL VACCINE: HCPCS | Mod: PBBFAC

## 2024-09-03 PROCEDURE — G2211 COMPLEX E/M VISIT ADD ON: HCPCS | Mod: S$PBB,,, | Performed by: STUDENT IN AN ORGANIZED HEALTH CARE EDUCATION/TRAINING PROGRAM

## 2024-09-03 RX ADMIN — PNEUMOCOCCAL 20-VALENT CONJUGATE VACCINE 0.5 ML
2.2; 2.2; 2.2; 2.2; 2.2; 2.2; 2.2; 2.2; 2.2; 2.2; 2.2; 2.2; 2.2; 2.2; 2.2; 2.2; 4.4; 2.2; 2.2; 2.2 INJECTION, SUSPENSION INTRAMUSCULAR at 03:09

## 2024-09-03 NOTE — PROGRESS NOTES
After obtaining consent, and per orders of Dr. Davidson , injection of Prevnar 20 ( Lot [id number],  ) given by Roxana Finney. Patient instructed to remain in clinic for 20 minutes afterwards, and to report any adverse reaction to me immediately.

## 2024-09-03 NOTE — PROGRESS NOTES
Subjective:       Patient ID: Edvin Monzon is a 69 y.o. male.    Chief Complaint: Health maintenance examination [Z00.00]    Patient is established with me, here today for the following:     Health maintenance -   Cologuard negative [id number]. Due for repeat.  Denies family history of colorectal cancer.  Denies significant family history of cardiac disease.  Denies family history of prostate cancer.  Lab Results       Component                Value               Date                       PSA                      0.42                03/27/2023                 PSA                      0.30                11/21/2013                   UTD on COVID primary/booster vaccinations.  Due for influenza, Tdap, COVID, PCV20, shingles, RSV  vaccinations.  Never smoker.  Completed HIV and hepatitis C screening.  Due for diabetes screening.  Lab Results       Component                Value               Date                       HGBA1C                   5.6                 03/27/2023            Endorses overall healthy diet.   Eats mostly at home.  Endorses exercising routinely.  Endorses active lifestyle.     Following with Dr. Eddie Altamirano for orthopedics.  Has been experiencing knee pain for past 20+ years, right>left      HLD -   Lab Results       Component                Value               Date                       CHOL                     221 (H)             03/27/2023            Lab Results       Component                Value               Date                       TRIG                     123                 03/27/2023            Lab Results       Component                Value               Date                       LDLCALC                  153.4               03/27/2023            Lab Results       Component                Value               Date                       HDL                      43                  03/27/2023            The 10-year ASCVD risk score (Mike STRANGE, et al., 2019) is: 19.1%  Due for  lipid check.     Vitamin B12 deficiency -  Not currently taking vitamin B12 supplementation.             Review of Systems   Constitutional:  Negative for chills, fatigue, fever and unexpected weight change.   Respiratory:  Negative for cough and shortness of breath.    Cardiovascular:  Negative for chest pain, palpitations and leg swelling.   Neurological:  Negative for dizziness, syncope and headaches.         Current Outpatient Medications   Medication Instructions    ascorbic acid (vitamin C) (VITAMIN C) 1,000 mg, Oral, Daily    azelastine (ASTELIN) 137 mcg, Nasal, 2 times daily    b complex vitamins tablet 1 tablet, Oral, Daily    doxycycline (VIBRA-TABS) 100 MG tablet Take one qd with food prn     Objective:      Vitals:    09/03/24 1432   BP: 124/80   Pulse: 76   SpO2: 98%   Weight: 81.1 kg (178 lb 12.7 oz)   PainSc: 0-No pain     Body mass index is 27.19 kg/m².    Physical Exam  Vitals reviewed.   Constitutional:       General: He is not in acute distress.     Appearance: Normal appearance. He is not ill-appearing or diaphoretic.   HENT:      Head: Normocephalic and atraumatic.      Right Ear: Tympanic membrane, ear canal and external ear normal. There is no impacted cerumen.      Left Ear: Tympanic membrane, ear canal and external ear normal. There is no impacted cerumen.      Nose: Nose normal. No rhinorrhea.      Mouth/Throat:      Mouth: Mucous membranes are moist.      Pharynx: Oropharynx is clear. No oropharyngeal exudate or posterior oropharyngeal erythema.   Eyes:      General: No scleral icterus.        Right eye: No discharge.         Left eye: No discharge.      Conjunctiva/sclera: Conjunctivae normal.   Neck:      Thyroid: No thyromegaly or thyroid tenderness.      Trachea: Trachea normal.   Cardiovascular:      Rate and Rhythm: Normal rate and regular rhythm.      Heart sounds: Normal heart sounds. No murmur heard.     No friction rub. No gallop.   Pulmonary:      Effort: Pulmonary effort is  normal. No respiratory distress.      Breath sounds: Normal breath sounds. No stridor. No wheezing, rhonchi or rales.   Abdominal:      General: Bowel sounds are normal. There is no distension.      Palpations: Abdomen is soft.      Tenderness: There is no abdominal tenderness. There is no guarding or rebound.   Musculoskeletal:         General: No swelling or deformity.      Cervical back: Neck supple.   Lymphadenopathy:      Head:      Right side of head: No submandibular or posterior auricular adenopathy.      Left side of head: No submandibular or posterior auricular adenopathy.      Cervical: No cervical adenopathy.      Right cervical: No superficial, deep or posterior cervical adenopathy.     Left cervical: No superficial, deep or posterior cervical adenopathy.      Upper Body:      Right upper body: No supraclavicular adenopathy.      Left upper body: No supraclavicular adenopathy.   Skin:     General: Skin is warm and dry.   Neurological:      General: No focal deficit present.      Mental Status: He is alert. Mental status is at baseline.      Gait: Gait normal.   Psychiatric:         Mood and Affect: Mood normal.         Behavior: Behavior normal.         Assessment:       1. Health maintenance examination    2. Mixed hyperlipidemia    3. Chronic pain of both knees    4. Vitamin B12 deficiency    5. Need for vaccination    6. Abnormal glucose    7. Screening for cardiovascular condition    8. Screening for colorectal cancer    9. Screening for prostate cancer        Plan:       Mixed hyperlipidemia  Continue healthy diet and lifestyle modifications  -     Comprehensive Metabolic Panel; Future  -     TSH; Future  -     CBC Auto Differential; Future    Chronic pain of both knees  Interested in OMT with Dr. Reilly   Continue evaluation and management per orthopedist.  -     Ambulatory referral/consult to Sports Medicine; Future    Vitamin B12 deficiency  -     VITAMIN B12; Future    Health maintenance  examination  Reviewed and discussed age appropriate screenings and immunizations.  RTC in 1 year for annual appointment, sooner PRN.  -     Comprehensive Metabolic Panel; Future  -     TSH; Future  -     Lipid Panel; Future  -     Hemoglobin A1C; Future  -     CBC Auto Differential; Future  -     PSA, Screening; Future  -     VITAMIN B12; Future    Need for vaccination  -     pneumoc 20-princess conj-dip cr(PF) (PREVNAR-20 (PF)) injection Syrg 0.5 mL    Abnormal glucose  -     Hemoglobin A1C; Future    Screening for cardiovascular condition  -     Lipid Panel; Future    Screening for colorectal cancer  -     Cologuard Screening (Multitarget Stool DNA); Future  -     Cologuard Screening (Multitarget Stool DNA)    Screening for prostate cancer  -     PSA, Screening; Future      Chayo Davidson MD  9/3/2024

## 2024-09-10 DIAGNOSIS — M25.561 BILATERAL CHRONIC KNEE PAIN: Primary | ICD-10-CM

## 2024-09-10 DIAGNOSIS — G89.29 BILATERAL CHRONIC KNEE PAIN: Primary | ICD-10-CM

## 2024-09-10 DIAGNOSIS — M25.562 BILATERAL CHRONIC KNEE PAIN: Primary | ICD-10-CM

## 2024-09-21 ENCOUNTER — LAB VISIT (OUTPATIENT)
Dept: LAB | Facility: HOSPITAL | Age: 69
End: 2024-09-21
Attending: STUDENT IN AN ORGANIZED HEALTH CARE EDUCATION/TRAINING PROGRAM
Payer: MEDICARE

## 2024-09-21 DIAGNOSIS — Z12.5 SCREENING FOR PROSTATE CANCER: ICD-10-CM

## 2024-09-21 DIAGNOSIS — Z13.6 SCREENING FOR CARDIOVASCULAR CONDITION: ICD-10-CM

## 2024-09-21 DIAGNOSIS — Z00.00 HEALTH MAINTENANCE EXAMINATION: ICD-10-CM

## 2024-09-21 DIAGNOSIS — E53.8 VITAMIN B12 DEFICIENCY: ICD-10-CM

## 2024-09-21 DIAGNOSIS — E78.2 MIXED HYPERLIPIDEMIA: ICD-10-CM

## 2024-09-21 DIAGNOSIS — R73.09 ABNORMAL GLUCOSE: ICD-10-CM

## 2024-09-21 LAB
ALBUMIN SERPL BCP-MCNC: 3.8 G/DL (ref 3.5–5.2)
ALP SERPL-CCNC: 53 U/L (ref 55–135)
ALT SERPL W/O P-5'-P-CCNC: 18 U/L (ref 10–44)
ANION GAP SERPL CALC-SCNC: 8 MMOL/L (ref 8–16)
AST SERPL-CCNC: 16 U/L (ref 10–40)
BASOPHILS # BLD AUTO: 0.09 K/UL (ref 0–0.2)
BASOPHILS NFR BLD: 0.9 % (ref 0–1.9)
BILIRUB SERPL-MCNC: 0.7 MG/DL (ref 0.1–1)
BUN SERPL-MCNC: 20 MG/DL (ref 8–23)
CALCIUM SERPL-MCNC: 8.9 MG/DL (ref 8.7–10.5)
CHLORIDE SERPL-SCNC: 104 MMOL/L (ref 95–110)
CHOLEST SERPL-MCNC: 229 MG/DL (ref 120–199)
CHOLEST/HDLC SERPL: 5.1 {RATIO} (ref 2–5)
CO2 SERPL-SCNC: 26 MMOL/L (ref 23–29)
COMPLEXED PSA SERPL-MCNC: 0.19 NG/ML (ref 0–4)
CREAT SERPL-MCNC: 0.9 MG/DL (ref 0.5–1.4)
DIFFERENTIAL METHOD BLD: ABNORMAL
EOSINOPHIL # BLD AUTO: 0.1 K/UL (ref 0–0.5)
EOSINOPHIL NFR BLD: 1.1 % (ref 0–8)
ERYTHROCYTE [DISTWIDTH] IN BLOOD BY AUTOMATED COUNT: 13.1 % (ref 11.5–14.5)
EST. GFR  (NO RACE VARIABLE): >60 ML/MIN/1.73 M^2
ESTIMATED AVG GLUCOSE: 105 MG/DL (ref 68–131)
GLUCOSE SERPL-MCNC: 80 MG/DL (ref 70–110)
HBA1C MFR BLD: 5.3 % (ref 4–5.6)
HCT VFR BLD AUTO: 45.5 % (ref 40–54)
HDLC SERPL-MCNC: 45 MG/DL (ref 40–75)
HDLC SERPL: 19.7 % (ref 20–50)
HGB BLD-MCNC: 15.4 G/DL (ref 14–18)
IMM GRANULOCYTES # BLD AUTO: 0.02 K/UL (ref 0–0.04)
IMM GRANULOCYTES NFR BLD AUTO: 0.2 % (ref 0–0.5)
LDLC SERPL CALC-MCNC: 153 MG/DL (ref 63–159)
LYMPHOCYTES # BLD AUTO: 4.1 K/UL (ref 1–4.8)
LYMPHOCYTES NFR BLD: 40.4 % (ref 18–48)
MCH RBC QN AUTO: 31.8 PG (ref 27–31)
MCHC RBC AUTO-ENTMCNC: 33.8 G/DL (ref 32–36)
MCV RBC AUTO: 94 FL (ref 82–98)
MONOCYTES # BLD AUTO: 1.1 K/UL (ref 0.3–1)
MONOCYTES NFR BLD: 11 % (ref 4–15)
NEUTROPHILS # BLD AUTO: 4.8 K/UL (ref 1.8–7.7)
NEUTROPHILS NFR BLD: 46.4 % (ref 38–73)
NONHDLC SERPL-MCNC: 184 MG/DL
NRBC BLD-RTO: 0 /100 WBC
PLATELET # BLD AUTO: 228 K/UL (ref 150–450)
PMV BLD AUTO: 11.8 FL (ref 9.2–12.9)
POTASSIUM SERPL-SCNC: 4.4 MMOL/L (ref 3.5–5.1)
PROT SERPL-MCNC: 6.8 G/DL (ref 6–8.4)
RBC # BLD AUTO: 4.84 M/UL (ref 4.6–6.2)
SODIUM SERPL-SCNC: 138 MMOL/L (ref 136–145)
TRIGL SERPL-MCNC: 155 MG/DL (ref 30–150)
TSH SERPL DL<=0.005 MIU/L-ACNC: 2.14 UIU/ML (ref 0.4–4)
VIT B12 SERPL-MCNC: 541 PG/ML (ref 210–950)
WBC # BLD AUTO: 10.22 K/UL (ref 3.9–12.7)

## 2024-09-21 PROCEDURE — 80053 COMPREHEN METABOLIC PANEL: CPT | Performed by: STUDENT IN AN ORGANIZED HEALTH CARE EDUCATION/TRAINING PROGRAM

## 2024-09-21 PROCEDURE — 85025 COMPLETE CBC W/AUTO DIFF WBC: CPT | Performed by: STUDENT IN AN ORGANIZED HEALTH CARE EDUCATION/TRAINING PROGRAM

## 2024-09-21 PROCEDURE — 80061 LIPID PANEL: CPT | Performed by: STUDENT IN AN ORGANIZED HEALTH CARE EDUCATION/TRAINING PROGRAM

## 2024-09-21 PROCEDURE — 82607 VITAMIN B-12: CPT | Performed by: STUDENT IN AN ORGANIZED HEALTH CARE EDUCATION/TRAINING PROGRAM

## 2024-09-21 PROCEDURE — 83036 HEMOGLOBIN GLYCOSYLATED A1C: CPT | Performed by: STUDENT IN AN ORGANIZED HEALTH CARE EDUCATION/TRAINING PROGRAM

## 2024-09-21 PROCEDURE — 84153 ASSAY OF PSA TOTAL: CPT | Performed by: STUDENT IN AN ORGANIZED HEALTH CARE EDUCATION/TRAINING PROGRAM

## 2024-09-21 PROCEDURE — 84443 ASSAY THYROID STIM HORMONE: CPT | Performed by: STUDENT IN AN ORGANIZED HEALTH CARE EDUCATION/TRAINING PROGRAM

## 2024-09-21 PROCEDURE — 36415 COLL VENOUS BLD VENIPUNCTURE: CPT | Performed by: STUDENT IN AN ORGANIZED HEALTH CARE EDUCATION/TRAINING PROGRAM

## 2024-10-01 ENCOUNTER — PATIENT MESSAGE (OUTPATIENT)
Dept: INTERNAL MEDICINE | Facility: CLINIC | Age: 69
End: 2024-10-01
Payer: MEDICARE

## 2024-10-08 ENCOUNTER — PATIENT MESSAGE (OUTPATIENT)
Dept: INTERNAL MEDICINE | Facility: CLINIC | Age: 69
End: 2024-10-08
Payer: MEDICARE

## 2024-10-21 ENCOUNTER — PATIENT MESSAGE (OUTPATIENT)
Dept: INTERNAL MEDICINE | Facility: CLINIC | Age: 69
End: 2024-10-21

## 2024-10-21 ENCOUNTER — OFFICE VISIT (OUTPATIENT)
Dept: INTERNAL MEDICINE | Facility: CLINIC | Age: 69
End: 2024-10-21
Payer: MEDICARE

## 2024-10-21 DIAGNOSIS — Z91.09 ENVIRONMENTAL ALLERGIES: ICD-10-CM

## 2024-10-21 DIAGNOSIS — G89.29 CHRONIC PAIN OF BOTH KNEES: ICD-10-CM

## 2024-10-21 DIAGNOSIS — M25.561 CHRONIC PAIN OF BOTH KNEES: ICD-10-CM

## 2024-10-21 DIAGNOSIS — E78.2 MIXED HYPERLIPIDEMIA: Primary | ICD-10-CM

## 2024-10-21 DIAGNOSIS — M25.562 CHRONIC PAIN OF BOTH KNEES: ICD-10-CM

## 2024-10-21 DIAGNOSIS — Z23 NEED FOR VACCINATION: ICD-10-CM

## 2024-10-21 NOTE — PROGRESS NOTES
"The patient's location is:  [place]  The chief complaint leading to consultation is:  Mixed hyperlipidemia [E78.2]    Visit type: audiovisual    Time spent in discussion with the patient: 29 minutes  34 minutes of total time spent on the encounter. This includes time spent in discussion with the patient and time preparing for the patient appointment: review of tests, obtaining and/or reviewing separately obtained history, documenting clinical information in the electronic or other health record, independently interpreting results (not separately reported), and communicating results to the patient/family/caregiver or care coordination (not separately reported).     Each patient to whom he or she provides medical services by telemedicine is: (1) informed of the relationship between the physician and patient and the respective role of any other health care provider with respect to management of the patient; and (2) notified that he or she may decline to receive medical services by telemedicine and may withdraw from such care at any time.      Subjective:   Edvin Monzon is a 69 y.o. male.    Patient is established with me, here today for the following:    History of Present Illness      LIPID PANEL AND CHOLESTEROL MANAGEMENT:  His lipid panel shows elevated total cholesterol with HDL at 45 and LDL high at 153. He is considering red yeast rice supplement as an alternative to statins for cholesterol management, but expresses concerns about its safety and efficacy. He has read about potential ill effects and questionable sources, and is seeking more information about this supplement option.    KNEE PAIN:  He reports right knee discomfort with recent exacerbation, gradually worsening over time. He has a history of falling on both knees approximately 15-20 years ago. Recent activities have caused increased discomfort, which he describes as "uncomfortable" and interfering with daily activities. He is scheduled to see a " physical therapist for evaluation and treatment, expressing hope for symptom alleviation but acknowledging potential need for further intervention.    LABORATORY RESULTS:  His comprehensive metabolic panel shows slightly low alkaline phosphatase, which is not of clinical concern. Kidney function remains stable with electrolytes and liver function tests within normal limits. PSA test result was 0.19, which he understands to be favorable for prostate cancer screening. CBC results show slightly elevated MCH. He has a history of ehrlichiosis that significantly impacted his liver function, recalling a severe episode where an emergency room physician was surprised at his ability to remain ambulatory given the severity of his blood abnormalities. TSH was reported as normal, falling within the middle of the reference range.    INFECTIOUS DISEASE HISTORY:  He reports positive antibodies for mononucleosis but expresses uncertainty about when the infection occurred, stating he is unaware of having had mononucleosis in the past.    VACCINATION STATUS:  He is due for the newest COVID vaccine, flu vaccine, and RSV vaccine. He attempted to receive the COVID vaccine during a recent test, but it was unavailable. He expresses intention to obtain these vaccinations.    ALLERGIES:  He reports allergies to cats, dust, and pollen. He has a cat despite his allergy. Dust exposure is unavoidable. He describes pollen allergies as constant, indicating year-round symptoms due to continuous exposure.    WEIGHT MANAGEMENT:  He reports recent weight gain after previously losing weight to 170 lbs. He expresses difficulty exercising due to knee pain, particularly in his right knee. The knee pain has been interfering with his ability to use his bike for exercise, which he had been doing previously to manage his weight. He is concerned about the impact of reduced physical activity on his weight management efforts.      ROS:  General: -fever, +weight  gain  Musculoskeletal: +joint pain  Allergic: +allergic reactions, -seasonal allergies       HLD -   Lab Results   Component Value Date    CHOL 229 (H) 09/21/2024     Lab Results   Component Value Date    TRIG 155 (H) 09/21/2024     Lab Results   Component Value Date    LDLCALC 153.0 09/21/2024     Lab Results   Component Value Date    HDL 45 09/21/2024   The 10-year ASCVD risk score (Mike STRANGE, et al., 2019) is: 18.1%    Lab Results   Component Value Date    TSH 2.136 09/21/2024    TSH 1.151 03/27/2023    TSH 1.2 10/12/2007     Lab Results   Component Value Date    HGB 15.4 09/21/2024    HCT 45.5 09/21/2024    MCV 94 09/21/2024    RDW 13.1 09/21/2024     Lab Results   Component Value Date    [id number] 541 09/21/2024     Lab Results   Component Value Date    PSA 0.19 09/21/2024    PSA 0.42 03/27/2023     Lab Results   Component Value Date    HGBA1C 5.3 09/21/2024    HGBA1C 5.6 03/27/2023       Current Outpatient Medications   Medication Instructions    ascorbic acid (vitamin C) (VITAMIN C) 1,000 mg, Oral, Daily    azelastine (ASTELIN) 137 mcg, Nasal, 2 times daily    b complex vitamins tablet 1 tablet, Oral, Daily    doxycycline (VIBRA-TABS) 100 MG tablet Take one qd with food prn       Objective:   There were no vitals filed for this visit.     Physical Exam  Constitutional:       General: He is not in acute distress.     Appearance: Normal appearance. He is not ill-appearing or diaphoretic.   Neurological:      Mental Status: He is alert.   Psychiatric:         Attention and Perception: Attention normal.         Mood and Affect: Mood and affect normal.         Speech: Speech normal.           Assessment/Plan:   Mixed hyperlipidemia  -     Comprehensive Metabolic Panel; Future; Expected date: 04/21/2025  -     Lipid Panel; Future; Expected date: 04/21/2025  -     CBC Auto Differential; Future; Expected date: 04/21/2025    Chronic pain of both knees    Need for vaccination    Environmental allergies            Assessment & Plan    Reviewed comprehensive metabolic panel results: alkaline phosphatase slightly low but not concerning, kidney function stable, electrolytes normal, liver function normal  Assessed lipid panel: LDL cholesterol elevated at 153 mg/dL, HDL cholesterol acceptable at 45 mg/dL  Calculated ASCVD risk score of 18%, indicating recommendation for cholesterol medication based on risk.  Considered CT calcium score as potential next step in risk stratification for coronary artery disease, given patient's reluctance to start medication  Evaluated PSA, CBC, thyroid function, and diabetes screening results as normal    HYPERLIPIDEMIA:  - Explained components of cholesterol panel: LDL (bad cholesterol) should be low, HDL (good cholesterol) should be elevated.  - Discussed ASCVD risk score calculation and its implications for treatment recommendations.  - Provided information on CT calcium score test: purpose, process, and potential benefits in assessing coronary artery disease risk.  - Edvin to consider trying red yeast rice supplement for cholesterol management.  - Edvin to attempt lifestyle modifications to improve cholesterol levels over the next 6 months.  - Discussed potential use of red yeast rice supplement for cholesterol management.    TREVOR-PEREIRA VIRUS:  - Clarified the nature of Trevor-Barr virus (causative agent of mononucleosis) as a herpes virus.    EOSINOPHILIA:  - Explained eosinophils' role in allergies and parasitic infections.    LABS:  - Lipid panel and comprehensive metabolic panel ordered for follow up in 6 months.    FOLLOW UP:  - Follow up in 6 months for annual visit and to review lab results.  - Contact office if deciding to pursue CT calcium score before next appointment.           Chayo Davidson MD  10/21/2024

## 2024-11-22 ENCOUNTER — IMMUNIZATION (OUTPATIENT)
Dept: INTERNAL MEDICINE | Facility: CLINIC | Age: 69
End: 2024-11-22
Payer: MEDICARE

## 2024-11-22 DIAGNOSIS — Z23 NEED FOR VACCINATION: Primary | ICD-10-CM

## 2024-12-16 ENCOUNTER — OFFICE VISIT (OUTPATIENT)
Dept: SURGERY | Facility: CLINIC | Age: 69
End: 2024-12-16
Payer: MEDICARE

## 2024-12-16 VITALS
HEIGHT: 68 IN | HEART RATE: 95 BPM | SYSTOLIC BLOOD PRESSURE: 140 MMHG | WEIGHT: 179 LBS | DIASTOLIC BLOOD PRESSURE: 90 MMHG | BODY MASS INDEX: 27.13 KG/M2

## 2024-12-16 DIAGNOSIS — K64.1 GRADE II HEMORRHOIDS: Primary | ICD-10-CM

## 2024-12-16 PROCEDURE — 99999 PR PBB SHADOW E&M-EST. PATIENT-LVL III: CPT | Mod: PBBFAC,,, | Performed by: SURGERY

## 2024-12-16 PROCEDURE — 99204 OFFICE O/P NEW MOD 45 MIN: CPT | Mod: S$PBB,,, | Performed by: SURGERY

## 2024-12-16 PROCEDURE — 99213 OFFICE O/P EST LOW 20 MIN: CPT | Mod: PBBFAC | Performed by: SURGERY

## 2024-12-16 NOTE — PROGRESS NOTES
CRS Office Visit History and Physical    Referring Md:   Self, Aaarefgutierrez  No address on file    SUBJECTIVE:     Chief Complaint: hemorrhoids    History of Present Illness:  The patient is a new patient to this practice.   Course is as follows:  Edvin Monzon is a 69 y.o. male presents with rectal bleeding.  Reports a history of intermittent rectal bleeding but symptoms have been more severe lately.  Reports bright red blood per rectum.  Has 2-3 bowel movements in the mornings.  Sometimes has to strain, feels that the angle of his colon has changed.  No fiber supplement or laxatives.  No family history of colon cancer or IBD.     Last Colonoscopy: 2012, normal.  Negative cologuard 9/2024     Review of patient's allergies indicates:   Allergen Reactions    Corticosteroids (glucocorticoids)     Grass pollen-june grass standard     House dust        Past Medical History:   Diagnosis Date    Allergy     Arthritis     Ehrlichiosis, unspecified     History of colonoscopy     normal according to the patient in 2012.  He was told to return in 2022    Joint pain      Past Surgical History:   Procedure Laterality Date    CATARACT EXTRACTION Right 07/07/2021    SHOULDER SURGERY Right 2015    TONSILLECTOMY       Family History   Problem Relation Name Age of Onset    Alzheimer's disease Mother age 76     Hyperlipidemia Father age 84     Arthritis Father age 84     Hearing loss Father age 84     Melanoma Neg Hx       Social History     Tobacco Use    Smoking status: Never     Passive exposure: Never    Smokeless tobacco: Never    Tobacco comments:     Hate it   Substance Use Topics    Alcohol use: Yes     Alcohol/week: 8.0 standard drinks of alcohol     Types: 3 Glasses of wine, 5 Cans of beer per week     Comment: A beer, a glass of wine now and then    Drug use: No        Review of Systems:  Review of Systems   All other systems reviewed and are negative.      OBJECTIVE:     Vital Signs (Most Recent)  BP (!) 140/90   Pulse  "95   Ht 5' 8" (1.727 m)   Wt 81.2 kg (179 lb 0.2 oz)   BMI 27.22 kg/m²     Physical Exam:  General: 69 y.o. male in no distress   Neuro: alert and oriented x 4.  Moves all extremities.     HEENT: normocephalic, atraumatic, PERRL, EOMI   Respiratory: respirations are even and unlabored  Cardiac: regular rate and rhythm  Abdomen: soft, NTND   Extremities: Warm dry and intact  Skin: no rashes  Anorectal: no external lesions or fissure, anal verge hemorrhoids, MYRIAM with intact tone, no blood     Anoscopy: Verbal consent obtained. A lubricated anoscope was inserted and circumferential inspection performed.  There was melenotic stool in the rectal vault.  This was cleared.  There were internal hemorrhoids in the posterior and left lateral positions with prominent vasculature and stigmata of prolapse.  No active bleeding. Stool higher in the rectal vault without evidence of blood.  The scope was withdrawn.     Labs: NA    Imaging: NA      ASSESSMENT/PLAN:     There are no diagnoses linked to this encounter.    69 y.o. male with rectal bleeding due to suspected hemorrhoids    - Prior labs reviewed, negative cologuard in 2021 and 2024.  Hgb without anemia in September.   - Start daily fiber supplement, recommend benefiber 2 tsp daily   - increase water intake, 64-80 oz daily   - avoid straining/constipation   - anusol suppositories not covered by insurance, prescribed phenylephrine/cocoa butter suppositories.  Use twice a day for 10 days.  Can use preparation H or calmol4 OTC if not covered.   - Follow up in 6-8 weeks or sooner if bleeding worsens.  Briefly discussed hemorrhoid banding if no improvement.     Britany Helms MD, FACS  Staff Surgeon  Colon & Rectal Surgery    "

## 2025-02-03 ENCOUNTER — OFFICE VISIT (OUTPATIENT)
Dept: SURGERY | Facility: CLINIC | Age: 70
End: 2025-02-03
Payer: MEDICARE

## 2025-02-03 VITALS
DIASTOLIC BLOOD PRESSURE: 86 MMHG | BODY MASS INDEX: 26.83 KG/M2 | WEIGHT: 177 LBS | SYSTOLIC BLOOD PRESSURE: 149 MMHG | HEIGHT: 68 IN | HEART RATE: 74 BPM

## 2025-02-03 DIAGNOSIS — K64.8 INTERNAL HEMORRHOIDS: Primary | ICD-10-CM

## 2025-02-03 PROCEDURE — 46600 DIAGNOSTIC ANOSCOPY SPX: CPT | Mod: PBBFAC | Performed by: SURGERY

## 2025-02-03 PROCEDURE — 99213 OFFICE O/P EST LOW 20 MIN: CPT | Mod: PBBFAC | Performed by: SURGERY

## 2025-02-03 PROCEDURE — 99214 OFFICE O/P EST MOD 30 MIN: CPT | Mod: 25,S$PBB,, | Performed by: SURGERY

## 2025-02-03 PROCEDURE — 99999 PR PBB SHADOW E&M-EST. PATIENT-LVL III: CPT | Mod: PBBFAC,,, | Performed by: SURGERY

## 2025-02-03 PROCEDURE — 46600 DIAGNOSTIC ANOSCOPY SPX: CPT | Mod: S$PBB,,, | Performed by: SURGERY

## 2025-02-03 NOTE — PROCEDURES
Procedures    Anoscopy:   Verbal consent obtained. A lubricated anoscope was inserted and circumferential inspection performed.  There was no further bleeding in the anal canal.  There were internal hemorrhoids with prominent vasculature in the right anterior and left lateral positions.  The scope was withdrawn.       Britany Helms MD, FACS  Staff Surgeon   Colon & Rectal Surgery

## 2025-02-03 NOTE — PROGRESS NOTES
"CRS Office Visit Follow-up  Referring Md:   No referring provider defined for this encounter.    SUBJECTIVE:     Chief Complaint: hemorrhoids    History of Present Illness:  Patient is a 69 y.o. male presents with hemorrhoidal bleeding. The patient is a established patient to this practice.     Course is as follows:  12/16/24  Seen in clinic. Reports a history of intermittent rectal bleeding but symptoms have been more severe lately.  Reports bright red blood per rectum.  Has 2-3 bowel movements in the mornings.  Sometimes has to strain, feels that the angle of his colon has changed.  No fiber supplement or laxatives.  No family history of colon cancer or IBD.  Advised benefiber daily, <5-10min on toilet at a time, anusol suppositories. Not banded since going on road trip soon.    2/3/25  Bleeding resolved after a few days, took anusol and used some preparation H. Taking benefiber almost daily with smoothies, not straining, soft BM 1-3x/d. Feels like stool gets stuck but is able to pass once he relaxes.    Last Colonoscopy: 2012, normal.  Negative cologuard 9/2024 (next 2027)    Review of Systems:  Review of Systems   All other systems reviewed and are negative.      OBJECTIVE:     Vital Signs (Most Recent)  BP (!) 149/86 (BP Location: Left arm, Patient Position: Sitting)   Pulse 74   Ht 5' 8" (1.727 m)   Wt 80.3 kg (177 lb 0.5 oz)   BMI 26.92 kg/m²     Physical Exam:  General: White male in no distress   Neuro: alert and oriented x 4.  Moves all extremities.     HEENT: no icterus.  Trachea midline  Respiratory: respirations are even and unlabored  Cardiac: regular rate  Abdomen: soft non distended  Extremities: Warm dry and intact  Skin: no rashes  Anorectal: no external pathology, sphincter tone appropriate, no lesions/defects on MYRIAM, anoscopy with mildly inflammed right anterior and left lateral internal hemorrhoids without stigmata of recent bleeding      ASSESSMENT/PLAN:     Diagnoses and all orders for " this visit:    Internal hemorrhoids    69 y.o. male with Hx of bleeding internal hemorrhoids  - Encouraged consistent daily fiber supplement, recommend benefiber 2 tsp daily   - Continue water intake, 64-80 oz daily   - avoid straining/constipation and time on toilet seat (ok to put the lid down and sit on this)  - discussed using a step stool to help with feeling of stuck stool as a postural modification device  - prn use preparation H or calmol4 OTC  - Return to clinic if bleeding recurs, would performed banding at bedside     Carleen Lyman MD  Colon & Rectal Surgery Fellow

## 2025-02-24 DIAGNOSIS — Z00.00 ENCOUNTER FOR MEDICARE ANNUAL WELLNESS EXAM: ICD-10-CM

## 2025-06-03 ENCOUNTER — OFFICE VISIT (OUTPATIENT)
Dept: INTERNAL MEDICINE | Facility: CLINIC | Age: 70
End: 2025-06-03
Payer: MEDICARE

## 2025-06-03 VITALS
WEIGHT: 168.63 LBS | BODY MASS INDEX: 25.56 KG/M2 | SYSTOLIC BLOOD PRESSURE: 130 MMHG | HEART RATE: 68 BPM | HEIGHT: 68 IN | OXYGEN SATURATION: 98 % | DIASTOLIC BLOOD PRESSURE: 80 MMHG

## 2025-06-03 DIAGNOSIS — R73.09 ABNORMAL GLUCOSE: ICD-10-CM

## 2025-06-03 DIAGNOSIS — Z12.5 SCREENING FOR PROSTATE CANCER: ICD-10-CM

## 2025-06-03 DIAGNOSIS — E53.8 VITAMIN B12 DEFICIENCY: ICD-10-CM

## 2025-06-03 DIAGNOSIS — Z00.00 HEALTH MAINTENANCE EXAMINATION: Primary | ICD-10-CM

## 2025-06-03 DIAGNOSIS — E55.9 VITAMIN D DEFICIENCY: ICD-10-CM

## 2025-06-03 DIAGNOSIS — Z13.6 SCREENING FOR CARDIOVASCULAR CONDITION: ICD-10-CM

## 2025-06-03 DIAGNOSIS — E78.2 MIXED HYPERLIPIDEMIA: ICD-10-CM

## 2025-06-03 PROCEDURE — 99213 OFFICE O/P EST LOW 20 MIN: CPT | Mod: PBBFAC | Performed by: STUDENT IN AN ORGANIZED HEALTH CARE EDUCATION/TRAINING PROGRAM

## 2025-06-03 PROCEDURE — 99214 OFFICE O/P EST MOD 30 MIN: CPT | Mod: S$PBB,,, | Performed by: STUDENT IN AN ORGANIZED HEALTH CARE EDUCATION/TRAINING PROGRAM

## 2025-06-03 PROCEDURE — G2211 COMPLEX E/M VISIT ADD ON: HCPCS | Mod: ,,, | Performed by: STUDENT IN AN ORGANIZED HEALTH CARE EDUCATION/TRAINING PROGRAM

## 2025-06-03 PROCEDURE — 99999 PR PBB SHADOW E&M-EST. PATIENT-LVL III: CPT | Mod: PBBFAC,,, | Performed by: STUDENT IN AN ORGANIZED HEALTH CARE EDUCATION/TRAINING PROGRAM
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
## 2025-06-10 ENCOUNTER — OFFICE VISIT (OUTPATIENT)
Dept: DERMATOLOGY | Facility: CLINIC | Age: 70
End: 2025-06-10
Payer: MEDICARE

## 2025-06-10 DIAGNOSIS — L81.4 LENTIGINES: ICD-10-CM

## 2025-06-10 DIAGNOSIS — L71.9 ROSACEA: ICD-10-CM

## 2025-06-10 DIAGNOSIS — D22.9 NEVUS OF MULTIPLE SITES: ICD-10-CM

## 2025-06-10 DIAGNOSIS — L57.0 ACTINIC KERATOSIS: Primary | ICD-10-CM

## 2025-06-10 DIAGNOSIS — D18.01 CHERRY ANGIOMA: ICD-10-CM

## 2025-06-10 DIAGNOSIS — Z12.83 SKIN EXAM, SCREENING FOR CANCER: ICD-10-CM

## 2025-06-10 PROCEDURE — 99214 OFFICE O/P EST MOD 30 MIN: CPT | Mod: 25,S$PBB,, | Performed by: DERMATOLOGY

## 2025-06-10 PROCEDURE — 17004 DESTROY PREMAL LESIONS 15/>: CPT | Mod: S$PBB,,, | Performed by: DERMATOLOGY

## 2025-06-10 PROCEDURE — 17004 DESTROY PREMAL LESIONS 15/>: CPT | Mod: PBBFAC,PO | Performed by: DERMATOLOGY

## 2025-06-10 PROCEDURE — 99999 PR PBB SHADOW E&M-EST. PATIENT-LVL II: CPT | Mod: PBBFAC,,, | Performed by: DERMATOLOGY

## 2025-06-10 PROCEDURE — 99212 OFFICE O/P EST SF 10 MIN: CPT | Mod: PBBFAC,PO,25 | Performed by: DERMATOLOGY

## 2025-06-10 RX ORDER — DOXYCYCLINE HYCLATE 100 MG
TABLET ORAL
Qty: 30 TABLET | Refills: 1 | Status: SHIPPED | OUTPATIENT
Start: 2025-06-10

## 2025-06-10 NOTE — PROGRESS NOTES
Subjective:      Patient ID:  Edvin Monzon is a 69 y.o. male who presents for   Chief Complaint   Patient presents with    Skin Check     UBSE    Follow-up     rosacea     Follow up ocular rosacea states the doxycycline has helped for flares.  Also new spot on forehead does not bleed, he used efudex on it for 10 days and it peeled some.         Review of Systems   Constitutional:  Negative for fever, chills, weight loss, weight gain, fatigue, night sweats and malaise.   Skin:  Positive for daily sunscreen use, activity-related sunscreen use and wears hat. Negative for itching and rash.   Hematologic/Lymphatic: Does not bruise/bleed easily.       Objective:   Physical Exam   Constitutional: He appears well-developed and well-nourished. No distress.   Neurological: He is alert and oriented to person, place, and time. He is not disoriented.   Psychiatric: He has a normal mood and affect.   Skin:   Areas Examined (abnormalities noted in diagram):   Head / Face Inspection Performed  Neck Inspection Performed  Chest / Axilla Inspection Performed  Abdomen Inspection Performed  Back Inspection Performed  RUE Inspected  LUE Inspection Performed  RLE Inspected  LLE Inspection Performed                 Diagram Legend     Erythematous scaling macule/papule c/w actinic keratosis       Vascular papule c/w angioma      Pigmented verrucoid papule/plaque c/w seborrheic keratosis      Yellow umbilicated papule c/w sebaceous hyperplasia      Irregularly shaped tan macule c/w lentigo     1-2 mm smooth white papules consistent with Milia      Movable subcutaneous cyst with punctum c/w epidermal inclusion cyst      Subcutaneous movable cyst c/w pilar cyst      Firm pink to brown papule c/w dermatofibroma      Pedunculated fleshy papule(s) c/w skin tag(s)      Evenly pigmented macule c/w junctional nevus     Mildly variegated pigmented, slightly irregular-bordered macule c/w mildly atypical nevus      Flesh colored to evenly  "pigmented papule c/w intradermal nevus       Pink pearly papule/plaque c/w basal cell carcinoma      Erythematous hyperkeratotic cursted plaque c/w SCC      Surgical scar with no sign of skin cancer recurrence      Open and closed comedones      Inflammatory papules and pustules      Verrucoid papule consistent consistent with wart     Erythematous eczematous patches and plaques     Dystrophic onycholytic nail with subungual debris c/w onychomycosis     Umbilicated papule    Erythematous-base heme-crusted tan verrucoid plaque consistent with inflamed seborrheic keratosis     Erythematous Silvery Scaling Plaque c/w Psoriasis     See annotation      Assessment / Plan:        Actinic keratosis  Brochure provided   Cryosurgery Procedure Note    Verbal consent from the patient is obtained and the patient is aware of the precancerous quality and need for treatment of these lesions. Liquid nitrogen cryosurgery is applied to the 1 actinic keratosis, as detailed in the physical exam, to produce a freeze injury.  Use efudex starting next week [facility].       Rosacea  -     doxycycline (VIBRA-TABS) 100 MG tablet; Take one qd with food prn  Dispense: 30 tablet; Refill: 1 take prn flares     Lentigines  The "ABCD" rules to observe pigmented lesions were reviewed.      Monique angiomas  This is a benign vascular lesion. Reassurance given. No treatment required.       Nevus of multiple sites  antibody      Skin exam, screening for cancer    Upper body skin examination performed today including at least 6 points as noted in physical examination. No lesions suspicious for malignancy noted.    Recommend daily sun protection/avoidance and use of at least SPF 30, broad spectrum sunscreen (OTC drug).                Follow up in about 6 months (around 12/10/2025).  "

## 2025-06-11 ENCOUNTER — OFFICE VISIT (OUTPATIENT)
Dept: OTOLARYNGOLOGY | Facility: CLINIC | Age: 70
End: 2025-06-11
Payer: MEDICARE

## 2025-06-11 DIAGNOSIS — H93.8X1 SENSATION OF FULLNESS IN EAR, RIGHT: Primary | ICD-10-CM

## 2025-06-11 DIAGNOSIS — H61.23 BILATERAL IMPACTED CERUMEN: ICD-10-CM

## 2025-06-11 DIAGNOSIS — Z97.4 WEARS HEARING AID: ICD-10-CM

## 2025-06-11 DIAGNOSIS — Z86.69 HISTORY OF HEARING LOSS: ICD-10-CM

## 2025-06-11 PROCEDURE — 99212 OFFICE O/P EST SF 10 MIN: CPT | Mod: PBBFAC

## 2025-06-11 PROCEDURE — 99999 PR PBB SHADOW E&M-EST. PATIENT-LVL II: CPT | Mod: PBBFAC,,,

## 2025-06-11 PROCEDURE — 69210 REMOVE IMPACTED EAR WAX UNI: CPT | Mod: 50,PBBFAC

## 2025-06-11 NOTE — PROGRESS NOTES
Subjective:   Edvin Monzon is a 69 y.o. male who presents today for possible cerumen removal. He has a history of hearing loss and wears hearing aids (Jeromy from [employer]). Reports a hx of cerumen impactions requiring occasional cleanings. He complains of aural fullness bilaterally but is worse in the right ear. He has been unable to use his hearing aid recently due to cerumen. He also reports mild discomfort in the right ear. Denies otorrhea. Denies a history of ear infections or ear surgeries. 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     Past Medical History  He has a past medical history of Allergy, Arthritis, Ehrlichiosis, unspecified, History of colonoscopy, and Joint pain.    Past Surgical History  He has a past surgical history that includes Tonsillectomy; Shoulder surgery (Right, 2015); and Cataract extraction (Right, 07/07/2021).    Family History  His family history includes Alzheimer's disease in his mother; Arthritis in his father; Hearing loss in his father; Hyperlipidemia in his father.    Social History  He reports that he has never smoked. He has never been exposed to tobacco smoke. He has never used smokeless tobacco. He reports current alcohol use of about 8.0 standard drinks of alcohol per week. He reports that he does not use drugs.    Allergies  He is allergic to corticosteroids (glucocorticoids), grass pollen-honey grass standard, and house dust.    Medications  He has a current medication list which includes the following prescription(s): ascorbic acid (vitamin c), azelastine, b complex vitamins, and doxycycline.    Review of Systems   HENT: Positive for ear pain and hearing loss.  Negative for ear discharge and ear infection.          Objective:       Ears:    Right Ear: No drainage, swelling or tenderness. Tympanic membrane is not scarred, not perforated, not erythematous, not retracted and not bulging. No middle ear effusion.    Left Ear: No drainage, swelling or tenderness. Tympanic membrane is scarred (mild). Tympanic membrane is not perforated, not erythematous, not retracted and not bulging.  No middle ear effusion.   Ceruminous debris completely obstructing bilateral TM (R>>L), removed via microscopy.       Procedure  Cerumen removal performed.  See procedure note.  Procedure Note:  The patient was brought to the minor procedure room and placed under the operating microscope of the right and left ear canal which was cleaned of ceruminous debris. Using a combination of suction, curettes and cup forceps the patient's cerumen was removed. The patient tolerated the procedure well. There were no complications.     Audiology     Previous audiogram reviewed.       Assessment:     1. Sensation of fullness in ear, right    2. Bilateral impacted cerumen    3. History of hearing loss    4. Wears hearing aid      Plan:   Edvin was seen today for cerumen impaction.  Diagnoses and all orders for this visit:    Sensation of fullness in ear, right  Bilateral impacted cerumen  Cerumen impaction removed under microscopy. Patient tolerated procedure well and noted improvement upon impaction removal. Proper ear hygiene discussed. Follow up in 6 months for routine ear cleaning or sooner if needed.     History of hearing loss  Wears hearing aid  Last audiogram here was in 2023. States he has received a more recent one at [facility]. Follow up with dispensing audiologist as needed.

## 2025-08-29 DIAGNOSIS — M25.561 ACUTE PAIN OF RIGHT KNEE: Primary | ICD-10-CM

## 2025-09-02 ENCOUNTER — PATIENT MESSAGE (OUTPATIENT)
Dept: INTERNAL MEDICINE | Facility: CLINIC | Age: 70
End: 2025-09-02
Payer: MEDICARE